# Patient Record
Sex: MALE | Race: WHITE | NOT HISPANIC OR LATINO | Employment: OTHER | URBAN - METROPOLITAN AREA
[De-identification: names, ages, dates, MRNs, and addresses within clinical notes are randomized per-mention and may not be internally consistent; named-entity substitution may affect disease eponyms.]

---

## 2023-05-04 ENCOUNTER — APPOINTMENT (OUTPATIENT)
Dept: PRIMARY CARE | Facility: CLINIC | Age: 63
End: 2023-05-04
Payer: COMMERCIAL

## 2023-05-11 PROBLEM — H31.003 CHORIORETINAL SCAR OF BOTH EYES: Status: ACTIVE | Noted: 2023-05-11

## 2023-05-11 PROBLEM — N18.4 STAGE 4 CHRONIC KIDNEY DISEASE (MULTI): Status: ACTIVE | Noted: 2023-05-11

## 2023-05-11 PROBLEM — Q23.81 BICUSPID AORTIC VALVE: Status: ACTIVE | Noted: 2023-05-11

## 2023-05-11 PROBLEM — M79.643 HAND PAIN: Status: ACTIVE | Noted: 2023-05-11

## 2023-05-11 PROBLEM — L97.519 TOE ULCER, RIGHT (MULTI): Status: ACTIVE | Noted: 2023-05-11

## 2023-05-11 PROBLEM — H35.81 MACULAR RETINAL EDEMA: Status: ACTIVE | Noted: 2023-05-11

## 2023-05-11 PROBLEM — D23.9 DYSPLASTIC NEVUS: Status: ACTIVE | Noted: 2023-05-11

## 2023-05-11 PROBLEM — E11.9 DIABETES MELLITUS (MULTI): Status: ACTIVE | Noted: 2023-05-11

## 2023-05-11 PROBLEM — E78.5 HYPERLIPIDEMIA: Status: ACTIVE | Noted: 2023-05-11

## 2023-05-11 PROBLEM — H44.521 PHTHISIS BULBI OF RIGHT EYE: Status: ACTIVE | Noted: 2023-05-11

## 2023-05-11 PROBLEM — H33.41 TRD (TRACTION RETINAL DETACHMENT), RIGHT: Status: ACTIVE | Noted: 2023-05-11

## 2023-05-11 PROBLEM — Z96.1 PSEUDOPHAKIA OF RIGHT EYE: Status: ACTIVE | Noted: 2023-05-11

## 2023-05-11 PROBLEM — E04.1 THYROID NODULE: Status: ACTIVE | Noted: 2023-05-11

## 2023-05-11 PROBLEM — E66.01 MORBID OBESITY (MULTI): Status: ACTIVE | Noted: 2023-05-11

## 2023-05-11 PROBLEM — E83.10 DISORDER OF IRON METABOLISM: Status: ACTIVE | Noted: 2023-05-11

## 2023-05-11 PROBLEM — I77.9 RIGHT-SIDED CAROTID ARTERY DISEASE (CMS-HCC): Status: ACTIVE | Noted: 2023-05-11

## 2023-05-11 PROBLEM — G62.9 PERIPHERAL NEUROPATHY: Status: ACTIVE | Noted: 2023-05-11

## 2023-05-11 PROBLEM — J90 PLEURAL EFFUSION: Status: ACTIVE | Noted: 2023-05-11

## 2023-05-11 PROBLEM — M72.0 DUPUYTREN'S DISEASE OF PALM: Status: ACTIVE | Noted: 2023-05-11

## 2023-05-11 PROBLEM — I10 HYPERTENSION: Status: ACTIVE | Noted: 2023-05-11

## 2023-05-11 PROBLEM — N18.9 CHRONIC RENAL INSUFFICIENCY: Status: ACTIVE | Noted: 2023-05-11

## 2023-05-11 PROBLEM — I20.89 STABLE ANGINA (CMS-HCC): Status: ACTIVE | Noted: 2023-05-11

## 2023-05-11 PROBLEM — I25.2 PAST MYOCARDIAL INFARCTION: Status: ACTIVE | Noted: 2023-05-11

## 2023-05-11 PROBLEM — D64.9 ANEMIA: Status: ACTIVE | Noted: 2023-05-11

## 2023-05-11 PROBLEM — E11.3599 PDR (PROLIFERATIVE DIABETIC RETINOPATHY) (MULTI): Status: ACTIVE | Noted: 2023-05-11

## 2023-05-11 PROBLEM — I70.0 AORTIC ATHEROSCLEROSIS (CMS-HCC): Status: ACTIVE | Noted: 2023-05-11

## 2023-05-11 PROBLEM — H33.8 OLD RETINAL DETACHMENT, PARTIAL: Status: ACTIVE | Noted: 2023-05-11

## 2023-05-11 PROBLEM — G47.33 OBSTRUCTIVE SLEEP APNEA: Status: ACTIVE | Noted: 2023-05-11

## 2023-05-11 PROBLEM — Q23.1 BICUSPID AORTIC VALVE (HHS-HCC): Status: ACTIVE | Noted: 2023-05-11

## 2023-05-11 PROBLEM — I65.29 CAROTID ATHEROSCLEROSIS: Status: ACTIVE | Noted: 2023-05-11

## 2023-05-11 PROBLEM — E66.09 EXOGENOUS OBESITY: Status: ACTIVE | Noted: 2023-05-11

## 2023-05-11 PROBLEM — R06.00 DYSPNEA: Status: ACTIVE | Noted: 2023-05-11

## 2023-05-11 PROBLEM — I35.1 MILD AORTIC INSUFFICIENCY: Status: ACTIVE | Noted: 2023-05-11

## 2023-05-11 PROBLEM — H21.40: Status: ACTIVE | Noted: 2023-05-11

## 2023-05-11 PROBLEM — H15.011 ANTERIOR SCLERITIS OF RIGHT EYE: Status: ACTIVE | Noted: 2023-05-11

## 2023-05-11 PROBLEM — R07.89 ATYPICAL CHEST PAIN: Status: ACTIVE | Noted: 2023-05-11

## 2023-05-11 PROBLEM — L98.9 SKIN LESION: Status: ACTIVE | Noted: 2023-05-11

## 2023-05-11 PROBLEM — N49.2 SCROTAL ABSCESS: Status: ACTIVE | Noted: 2023-05-11

## 2023-05-11 PROBLEM — H21.1X9: Status: ACTIVE | Noted: 2023-05-11

## 2023-05-11 PROBLEM — I25.10 ATHEROSCLEROSIS OF CORONARY ARTERY: Status: ACTIVE | Noted: 2023-05-11

## 2023-05-11 PROBLEM — I35.0 AORTIC STENOSIS, MILD: Status: ACTIVE | Noted: 2023-05-11

## 2023-05-11 PROBLEM — M19.079 ARTHRITIS OF BIG TOE: Status: ACTIVE | Noted: 2023-05-11

## 2023-05-11 PROBLEM — E53.8 VITAMIN B12 DEFICIENCY: Status: ACTIVE | Noted: 2023-05-11

## 2023-05-11 PROBLEM — N52.9 MALE ERECTILE DISORDER: Status: ACTIVE | Noted: 2023-05-11

## 2023-05-11 PROBLEM — R25.2 LEG CRAMPS: Status: ACTIVE | Noted: 2023-05-11

## 2023-05-11 PROBLEM — E55.9 VITAMIN D DEFICIENCY: Status: ACTIVE | Noted: 2023-05-11

## 2023-05-11 PROBLEM — M17.12 ARTHRITIS OF LEFT KNEE: Status: ACTIVE | Noted: 2023-05-11

## 2023-05-11 PROBLEM — R06.02 SOB (SHORTNESS OF BREATH): Status: ACTIVE | Noted: 2023-05-11

## 2023-05-11 PROBLEM — J40 BRONCHITIS: Status: ACTIVE | Noted: 2023-05-11

## 2023-05-11 PROBLEM — G25.81 RLS (RESTLESS LEGS SYNDROME): Status: ACTIVE | Noted: 2023-05-11

## 2023-05-11 PROBLEM — G47.30 SLEEP APNEA: Status: ACTIVE | Noted: 2023-05-11

## 2023-05-11 PROBLEM — R60.9 EDEMA: Status: ACTIVE | Noted: 2023-05-11

## 2023-05-11 PROBLEM — U07.1 DISEASE DUE TO SEVERE ACUTE RESPIRATORY SYNDROME CORONAVIRUS 2 (SARS-COV-2): Status: ACTIVE | Noted: 2023-05-11

## 2023-05-11 PROBLEM — H35.059 RETINAL NEOVASCULARIZATION: Status: ACTIVE | Noted: 2023-05-11

## 2023-05-11 PROBLEM — S61.239A: Status: ACTIVE | Noted: 2023-05-11

## 2023-05-11 PROBLEM — H31.013: Status: ACTIVE | Noted: 2023-05-11

## 2023-05-11 PROBLEM — H25.10 NUCLEAR SCLEROTIC CATARACT: Status: ACTIVE | Noted: 2023-05-11

## 2023-05-11 PROBLEM — E11.621 DIABETIC FOOT ULCER (MULTI): Status: ACTIVE | Noted: 2023-05-11

## 2023-05-11 PROBLEM — L97.509 DIABETIC FOOT ULCER (MULTI): Status: ACTIVE | Noted: 2023-05-11

## 2023-05-11 PROBLEM — J01.90 ACUTE SINUSITIS: Status: ACTIVE | Noted: 2023-05-11

## 2023-05-11 RX ORDER — BLOOD-GLUCOSE METER
KIT MISCELLANEOUS DAILY
COMMUNITY
Start: 2018-02-25

## 2023-05-11 RX ORDER — LEVOTHYROXINE SODIUM 25 UG/1
25 TABLET ORAL DAILY
COMMUNITY

## 2023-05-11 RX ORDER — FENOFIBRATE 160 MG/1
1 TABLET ORAL DAILY
COMMUNITY
Start: 2016-12-23

## 2023-05-11 RX ORDER — LISINOPRIL 40 MG/1
40 TABLET ORAL DAILY
COMMUNITY
End: 2024-02-12 | Stop reason: WASHOUT

## 2023-05-11 RX ORDER — ASPIRIN 81 MG/1
81 TABLET ORAL DAILY
COMMUNITY
Start: 2018-04-17

## 2023-05-11 RX ORDER — TORSEMIDE 20 MG/1
20 TABLET ORAL DAILY
COMMUNITY
Start: 2013-03-27

## 2023-05-11 RX ORDER — FOLIC ACID/VIT B COMPLEX AND C 0.8 MG
0.8 TABLET ORAL DAILY
COMMUNITY
Start: 2017-03-11

## 2023-05-11 RX ORDER — LOSARTAN POTASSIUM 50 MG/1
50 TABLET ORAL DAILY
COMMUNITY
Start: 2020-12-15 | End: 2023-10-19

## 2023-05-11 RX ORDER — HYDRALAZINE HYDROCHLORIDE 25 MG/1
25 TABLET, FILM COATED ORAL 3 TIMES DAILY
COMMUNITY
Start: 2016-12-01

## 2023-05-11 RX ORDER — VIT C/E/ZN/COPPR/LUTEIN/ZEAXAN 250MG-90MG
25 CAPSULE ORAL DAILY
COMMUNITY

## 2023-05-11 RX ORDER — INSULIN LISPRO 100 [IU]/ML
INJECTION, SOLUTION INTRAVENOUS; SUBCUTANEOUS DAILY
COMMUNITY
Start: 2016-12-21 | End: 2023-10-24 | Stop reason: SDUPTHER

## 2023-05-11 RX ORDER — DULAGLUTIDE 4.5 MG/.5ML
4.5 INJECTION, SOLUTION SUBCUTANEOUS
COMMUNITY
Start: 2021-11-05 | End: 2023-10-24 | Stop reason: SDUPTHER

## 2023-05-11 RX ORDER — METOPROLOL TARTRATE 50 MG/1
50 TABLET ORAL 2 TIMES DAILY
COMMUNITY
Start: 2012-07-31 | End: 2023-05-12 | Stop reason: SDUPTHER

## 2023-05-11 RX ORDER — AMLODIPINE BESYLATE 10 MG/1
10 TABLET ORAL DAILY
COMMUNITY
Start: 2014-12-29 | End: 2023-05-12 | Stop reason: SDUPTHER

## 2023-05-11 RX ORDER — INSULIN GLARGINE-YFGN 100 [IU]/ML
INJECTION, SOLUTION SUBCUTANEOUS
COMMUNITY
Start: 2022-06-10 | End: 2023-10-24 | Stop reason: SDUPTHER

## 2023-05-11 RX ORDER — CLOPIDOGREL BISULFATE 75 MG/1
75 TABLET ORAL DAILY
COMMUNITY
Start: 2015-01-14 | End: 2023-05-12 | Stop reason: SDUPTHER

## 2023-05-11 RX ORDER — ROSUVASTATIN CALCIUM 40 MG/1
40 TABLET, COATED ORAL NIGHTLY
COMMUNITY
Start: 2018-09-12

## 2023-05-11 RX ORDER — ATORVASTATIN CALCIUM 40 MG/1
40 TABLET, FILM COATED ORAL DAILY
COMMUNITY

## 2023-05-11 RX ORDER — ICOSAPENT ETHYL 1 G/1
CAPSULE ORAL
COMMUNITY
Start: 2022-06-28

## 2023-05-12 ENCOUNTER — OFFICE VISIT (OUTPATIENT)
Dept: PRIMARY CARE | Facility: CLINIC | Age: 63
End: 2023-05-12
Payer: COMMERCIAL

## 2023-05-12 VITALS
SYSTOLIC BLOOD PRESSURE: 114 MMHG | DIASTOLIC BLOOD PRESSURE: 67 MMHG | OXYGEN SATURATION: 97 % | HEIGHT: 70 IN | WEIGHT: 296.6 LBS | BODY MASS INDEX: 42.46 KG/M2 | HEART RATE: 81 BPM | RESPIRATION RATE: 18 BRPM

## 2023-05-12 DIAGNOSIS — I25.10 ATHEROSCLEROSIS OF CORONARY ARTERY, UNSPECIFIED VESSEL OR LESION TYPE, UNSPECIFIED WHETHER ANGINA PRESENT, UNSPECIFIED WHETHER NATIVE OR TRANSPLANTED HEART: ICD-10-CM

## 2023-05-12 DIAGNOSIS — I10 PRIMARY HYPERTENSION: Primary | ICD-10-CM

## 2023-05-12 DIAGNOSIS — R43.9 DISTURBANCE OF SMELL: ICD-10-CM

## 2023-05-12 PROCEDURE — 4010F ACE/ARB THERAPY RXD/TAKEN: CPT | Performed by: FAMILY MEDICINE

## 2023-05-12 PROCEDURE — 3074F SYST BP LT 130 MM HG: CPT | Performed by: FAMILY MEDICINE

## 2023-05-12 PROCEDURE — 1036F TOBACCO NON-USER: CPT | Performed by: FAMILY MEDICINE

## 2023-05-12 PROCEDURE — 99214 OFFICE O/P EST MOD 30 MIN: CPT | Performed by: FAMILY MEDICINE

## 2023-05-12 PROCEDURE — 3078F DIAST BP <80 MM HG: CPT | Performed by: FAMILY MEDICINE

## 2023-05-12 RX ORDER — AMLODIPINE BESYLATE 10 MG/1
10 TABLET ORAL DAILY
Qty: 90 TABLET | Refills: 0 | Status: SHIPPED | OUTPATIENT
Start: 2023-05-12 | End: 2024-02-16

## 2023-05-12 RX ORDER — CLOPIDOGREL BISULFATE 75 MG/1
75 TABLET ORAL DAILY
Qty: 90 TABLET | Refills: 0 | Status: SHIPPED | OUTPATIENT
Start: 2023-05-12 | End: 2023-08-10

## 2023-05-12 RX ORDER — METOPROLOL TARTRATE 50 MG/1
50 TABLET ORAL 2 TIMES DAILY
Qty: 180 TABLET | Refills: 0 | Status: SHIPPED | OUTPATIENT
Start: 2023-05-12 | End: 2024-02-16

## 2023-05-12 NOTE — PATIENT INSTRUCTIONS
Refilled medications.  Referred to ENT as requested.  Follow up with specialists as directed.    F/U 3 months: Annual wellness visit.

## 2023-05-12 NOTE — PROGRESS NOTES
"Subjective   Patient ID: Nicolas Shultz is a 62 y.o. male who presents for NP TO [facility] CARE (Med refills).    HPI   Initial visit.    H/O CAD, HTN.  Condition(s) stable.  Taking med(s) as directed.  Requests refills (Metoprolol, Norvasc, Plavix).    Reports disturbance of smell.  Requests ENT referral.    Of note:  Current specialist include endo, nephrology, pulm, cardiology, podiatry, ophtho.    Review of Systems   All other systems reviewed and are negative.  Objective   /67   Pulse 81   Resp 18   Ht 1.778 m (5' 10\")   Wt 135 kg (296 lb 9.6 oz)   SpO2 97%   BMI 42.56 kg/m²   Physical Exam  Constitutional:       General: He is not in acute distress.     Appearance: He is obese.   Eyes:      Conjunctiva/sclera: Conjunctivae normal.   Cardiovascular:      Rate and Rhythm: Normal rate and regular rhythm.      Heart sounds: Murmur (3/6 systolic murmur @RUSB)) heard.   Pulmonary:      Effort: Pulmonary effort is normal.      Breath sounds: No wheezing, rhonchi or rales.   Neurological:      Mental Status: He is oriented to person, place, and time.   Psychiatric:         Mood and Affect: Mood normal.         Behavior: Behavior normal.     Assessment/Plan   Diagnoses and all orders for this visit:  Primary hypertension  -     metoprolol tartrate (Lopressor) 50 mg tablet; Take 1 tablet (50 mg) by mouth 2 times a day.  -     amLODIPine (Norvasc) 10 mg tablet; Take 1 tablet (10 mg) by mouth once daily.  Atherosclerosis of coronary artery, unspecified vessel or lesion type, unspecified whether angina present, unspecified whether native or transplanted heart  -     clopidogrel (Plavix) 75 mg tablet; Take 1 tablet (75 mg) by mouth once daily.  Disturbance of smell  -     Referral to ENT; Future    Refilled medications.  Referred to ENT as requested.  Follow up with specialists as directed.    F/U 3 months: Annual wellness visit.  "

## 2023-09-20 DIAGNOSIS — D12.2 ADENOMATOUS POLYP OF ASCENDING COLON: Primary | ICD-10-CM

## 2023-10-04 ENCOUNTER — OFFICE VISIT (OUTPATIENT)
Dept: OTOLARYNGOLOGY | Facility: CLINIC | Age: 63
End: 2023-10-04
Payer: COMMERCIAL

## 2023-10-04 VITALS — WEIGHT: 305.8 LBS | HEIGHT: 71 IN | BODY MASS INDEX: 42.81 KG/M2 | TEMPERATURE: 97.5 F

## 2023-10-04 DIAGNOSIS — R43.8 HYPOSMIA: Primary | ICD-10-CM

## 2023-10-04 DIAGNOSIS — R43.1 PAROSMIA: ICD-10-CM

## 2023-10-04 PROCEDURE — 31231 NASAL ENDOSCOPY DX: CPT | Performed by: NURSE PRACTITIONER

## 2023-10-04 PROCEDURE — 4010F ACE/ARB THERAPY RXD/TAKEN: CPT | Performed by: NURSE PRACTITIONER

## 2023-10-04 PROCEDURE — 3044F HG A1C LEVEL LT 7.0%: CPT | Performed by: NURSE PRACTITIONER

## 2023-10-04 PROCEDURE — 99204 OFFICE O/P NEW MOD 45 MIN: CPT | Performed by: NURSE PRACTITIONER

## 2023-10-04 PROCEDURE — 1036F TOBACCO NON-USER: CPT | Performed by: NURSE PRACTITIONER

## 2023-10-04 RX ORDER — GABAPENTIN 300 MG/1
300 CAPSULE ORAL 3 TIMES DAILY
COMMUNITY

## 2023-10-04 ASSESSMENT — PATIENT HEALTH QUESTIONNAIRE - PHQ9
1. LITTLE INTEREST OR PLEASURE IN DOING THINGS: NOT AT ALL
2. FEELING DOWN, DEPRESSED OR HOPELESS: NOT AT ALL
SUM OF ALL RESPONSES TO PHQ9 QUESTIONS 1 AND 2: 0

## 2023-10-04 NOTE — PATIENT INSTRUCTIONS
Today you were evaluated by Jazzy Doan CNP.    Please follow-up in January, soon if needed. If you have any questions or concerns, please contact my office at [phone].    Smell Retraining Therapy:  Over the course of 12 weeks, please initiate Smell Retraining Therapy.  This involves using scents that you were previously familiar with and reintroducing them to your smell nerves.   Twice daily, you should smell a strong recognizable scent (lemon/citrus, coffee, etc) for at least 10 seconds. While you do this, repeat what the smell is in your mind.  If you would like, one way to improve things further would be to purchase scented essential oils.  Studies have shown that sniffing four essential oils - jayme, eucalyptus, citronella, and clove - twice a day for 12 weeks can improve smell in some patients. These essential oils can be found at most online retailers. Several medical studies have shown an improvement in smell in some patients with using this method.

## 2023-10-04 NOTE — PROGRESS NOTES
Subjective   Patient ID: Nicolas Shultz is a 62 y.o. male who presents for Sinus Problem (Sinus issues for 1 year).  HPI  Nicolas Shultz  is a 62 y.o. old  male who presents today for evaluation of difficulty with smell for 1 year in duration. Specifically, the patient reports a sudden change in his sense of smell. He notes that things such as coffee, exhaust, gasoline, carry a sulfur/acid smell to them. He reports that this occurred suddenly. He wonders if use of gabapentin has contributed to some improvement of this (he has been on about a month). He denies difficulty with his sense of taste. He denies sinus disease, recent URI, head trauma, exposure to toxic fumes. Patient denies facial pressure, facial pain, periorbital edema, epiphora, and epistaxis. Does not have a history of allergic rhinitis or allergies. They deny anything like this occurring in the past. The patient has taken gabapentin, flonase to help alleviate the problem. This has improved the symptoms. Symptoms did occur about 1.5 weeks prior to a COVID infection.    I have also reviewed prior note from Dr. Praveen Crandall dated 6/13/23, 7/31/23  and this is contributing to my history and assessment.     I personally reviewed the patients CT scan images and results. I discussed the results personally with the patient. The following findings were discussed: 6/29/23: MRI Brain: Septum midline. Scattered inflammation through the ethmoid air cells. Otherwise, paranasal sinuses are clear. No obvious abnormalities along the skull base, near the olfactory nerves.        Review of Systems  Review of systems is negative for constitutional, ophthalmological, cardiac, pulmonary, renal, gastrointestinal, musculoskeletal, mental health, endocrine, or neurologic disorders (except as listed in the HPI, PMH, and Problem List).     Objective   Physical Exam  CONSTITUTIONAL: Vital signs reviewed. Patient appears well developed and well nourished.   GENERAL: this is a  healthy appearing male who appears stated age. The patient is alert and appropriately verbally conversant without hoarseness. This patient is in no apparent distress.   FACE: The face was inspected and no cutaneous masses or lesions were visualized. There was no erythema or edema noted. Facial movement was symmetric. No skin lesions were detected. There was no sinus tenderness elicited. TMJ crepitus *absent/present*.   EYES: Extra-ocular muscle function was intact. No nystagmus was observed. Pupils were equal.   CRANIAL NERVES: Cranial nerves II, III, IV, and VI were noted to be intact via extra-ocular muscle movement testing. Cranial nerve VII noted to be intact and symmetric by facial movement. Cranial nerves IX and X noted to be intact by gag reflex and palatal movement. Cranial nerve XII noted to be intact by active and symmetric tongue movement.   NOSE: Examination of the nose revealed the nasal dorsum to be midline. Intranasal exam reveals the septum is not significantly deviated. The inferior turbinates were without abnormality. No masses, polyps, mucopus, or other lesion on anterior rhinoscopy. See below procedure note as applicable for further exam.  ORAL CAVITY: Examination of the oral cavity revealed no mass lesions nor infection. The palate was noted to be intact. The tongue exhibited normal mobility. Mucosa was moist without lesion. The lips were free of lesion. Gums were free of inflammation. Dentition: normal without obvious infection or inflammation  OROPHARYNX: The oral pharynx was free of mass lesion or mucosal abnormality. The palate was noted to be without lesion. The uvula was normal appearing. The tonsils were normal appearing.  EARS: Examination of the ears revealed that the auricles were normally formed with no lesions. The external auditory canals were normal. The tympanic membranes were intact.  There is no inflammation visualized.   NECK: Visualization and palpation of the neck revealed no  mass lesions. No skin lesions or inflammatory processes were detected. The cervical musculature was normal to palpation.   CERVICAL LYMPHATICS: There were no palpable lymph nodes in the posterior triangle, submandibular triangle, jugulodigastric region, or central neck.  RESPIRATORY: Normal inspiration and expiration and chest wall expansion, no use of accessory muscles to breathe, no stridor.  NEUROLOGICAL: Patient is ambulatory without assist. Mentation is clear. Answering questions appropriately.     Nasal / sinus endoscopy    Date/Time: 10/4/2023 10:54 AM    Performed by: BANDAR Powell  Authorized by: BANDAR Powell    Consent:     Consent obtained:  Verbal    Consent given by:  Patient    Risks discussed:  Pain and bleeding    Alternatives discussed:  No treatment and observation  Procedure details:     Indications: assessment of airway and sino-nasal symptoms      Medication:  Afrin and Albert-Synephrine 2%    Instrument: flexible fiberoptic nasal endoscope      Scope location: bilateral nare    Post-procedure details:     Patient tolerance of procedure:  Tolerated well, no immediate complications  Comments:      Findings: After topical decongestion with decongestant and anesthetic spray, nasal endoscopy was performed using an endoscope. The septum was slightly right deviated. The inferior turbinates were normal.  The middle turbinates appeared healthy, the middle meatus is free of purulence, masses, lesions or polyps. The superior meatus and sphenoethmoid recess are clear bilaterally. Olfactory recess and sphenoethmoid recess is clear. The nasal passageway is patent. The nasopharynx was clear. There were no complications and the patient tolerated the procedure well.         Assessment/Plan     Assessment:  Nicolas Shultz is a 62 y.o. 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 Most likely cause is a viral insult to the  olfactory nerves versus a fume injury to the smell nerves. Rec. Smell retraining therapy.     Plan:  Reassurance and counseling provided to patient: We discussed that the most common reasons for alterations or loss of sense of smell/taste are due to idiopathic causes and chronic sinusitis with nasal polyps. Today the patient had no evidence of nasal polyps.    I personally reviewed the patients CT scan images and results. I discussed the results personally with the patient. The following findings were discussed: 6/29/23: MRI Brain: Septum midline. Scattered inflammation through the ethmoid air cells. Otherwise, paranasal sinuses are clear. No obvious abnormalities along the skull base, near the olfactory nerves.    1. The patient was counseled on smell retraining therapy. I would like him to begin this therapy and continue for 12 weeks. We discussed obtaining vials of essential oils and performing this twice daily.   2. The patient is having benefit with use of gabapentin. This is not something I typically offer for smell related injuries and I recommended that he follow up with either his PCP or neurology for continuation.  3. Patient will follow-up in 12 weeks or sooner if needed (virtually). Patient verbalizes understanding instructions and agrees with established plan of care.

## 2023-10-19 DIAGNOSIS — I10 PRIMARY HYPERTENSION: Primary | ICD-10-CM

## 2023-10-19 RX ORDER — LOSARTAN POTASSIUM 50 MG/1
50 TABLET ORAL DAILY
Qty: 30 TABLET | Refills: 0 | Status: SHIPPED | OUTPATIENT
Start: 2023-10-19 | End: 2023-11-20 | Stop reason: SDUPTHER

## 2023-10-24 DIAGNOSIS — Z79.4 TYPE 2 DIABETES MELLITUS WITHOUT COMPLICATION, WITH LONG-TERM CURRENT USE OF INSULIN (MULTI): ICD-10-CM

## 2023-10-24 DIAGNOSIS — E11.9 TYPE 2 DIABETES MELLITUS WITHOUT COMPLICATION, WITH LONG-TERM CURRENT USE OF INSULIN (MULTI): ICD-10-CM

## 2023-10-24 RX ORDER — INSULIN LISPRO 100 [IU]/ML
80 INJECTION, SOLUTION INTRAVENOUS; SUBCUTANEOUS DAILY
Qty: 75 ML | Refills: 3 | Status: SHIPPED | OUTPATIENT
Start: 2023-10-24

## 2023-10-24 RX ORDER — INSULIN GLARGINE-YFGN 100 [IU]/ML
70 INJECTION, SOLUTION SUBCUTANEOUS NIGHTLY
Qty: 90 ML | Refills: 3 | Status: SHIPPED | OUTPATIENT
Start: 2023-10-24

## 2023-10-24 RX ORDER — DULAGLUTIDE 4.5 MG/.5ML
4.5 INJECTION, SOLUTION SUBCUTANEOUS
Qty: 2 ML | Refills: 3 | Status: SHIPPED | OUTPATIENT
Start: 2023-10-24 | End: 2024-03-08 | Stop reason: SDUPTHER

## 2023-11-20 DIAGNOSIS — I10 PRIMARY HYPERTENSION: ICD-10-CM

## 2023-11-20 RX ORDER — LOSARTAN POTASSIUM 50 MG/1
50 TABLET ORAL DAILY
Qty: 90 TABLET | Refills: 3 | Status: SHIPPED | OUTPATIENT
Start: 2023-11-20 | End: 2024-11-19

## 2023-12-12 DIAGNOSIS — N18.4 STAGE 4 CHRONIC KIDNEY DISEASE (MULTI): Primary | ICD-10-CM

## 2023-12-21 ENCOUNTER — OFFICE VISIT (OUTPATIENT)
Dept: ENDOCRINOLOGY | Facility: CLINIC | Age: 63
End: 2023-12-21
Payer: COMMERCIAL

## 2023-12-21 VITALS — SYSTOLIC BLOOD PRESSURE: 124 MMHG | WEIGHT: 315 LBS | BODY MASS INDEX: 44.21 KG/M2 | DIASTOLIC BLOOD PRESSURE: 78 MMHG

## 2023-12-21 DIAGNOSIS — E04.1 THYROID NODULE: ICD-10-CM

## 2023-12-21 DIAGNOSIS — E78.5 HYPERLIPIDEMIA, UNSPECIFIED HYPERLIPIDEMIA TYPE: ICD-10-CM

## 2023-12-21 DIAGNOSIS — E11.9 TYPE 2 DIABETES MELLITUS WITHOUT COMPLICATION, WITH LONG-TERM CURRENT USE OF INSULIN (MULTI): Primary | ICD-10-CM

## 2023-12-21 DIAGNOSIS — Z79.4 TYPE 2 DIABETES MELLITUS WITHOUT COMPLICATION, WITH LONG-TERM CURRENT USE OF INSULIN (MULTI): Primary | ICD-10-CM

## 2023-12-21 DIAGNOSIS — I10 PRIMARY HYPERTENSION: ICD-10-CM

## 2023-12-21 LAB — POC HEMOGLOBIN A1C: 6.7 % (ref 4.2–6.5)

## 2023-12-21 PROCEDURE — 3078F DIAST BP <80 MM HG: CPT | Performed by: INTERNAL MEDICINE

## 2023-12-21 PROCEDURE — 3044F HG A1C LEVEL LT 7.0%: CPT | Performed by: INTERNAL MEDICINE

## 2023-12-21 PROCEDURE — 99214 OFFICE O/P EST MOD 30 MIN: CPT | Performed by: INTERNAL MEDICINE

## 2023-12-21 PROCEDURE — 4010F ACE/ARB THERAPY RXD/TAKEN: CPT | Performed by: INTERNAL MEDICINE

## 2023-12-21 PROCEDURE — 83036 HEMOGLOBIN GLYCOSYLATED A1C: CPT | Performed by: INTERNAL MEDICINE

## 2023-12-21 PROCEDURE — 1036F TOBACCO NON-USER: CPT | Performed by: INTERNAL MEDICINE

## 2023-12-21 PROCEDURE — 3074F SYST BP LT 130 MM HG: CPT | Performed by: INTERNAL MEDICINE

## 2023-12-21 NOTE — PROGRESS NOTES
Patient ID: Nicolas Shultz is a 63 y.o. male who presents for Follow-up.  HPI  The patient comes in for follow up.    Type 2 diabetes hypertension hyperlipidemia renal insufficiency thyroid nodules.    He continues on Humalog 20 to 30 units at breakfast 0 at lunch 40-50 at dinner Semglee 40 units and Trulicity 4.5 mg Jardiance through his nephrologist.    He continues using the freestyle sha 2.    His 90-day average 147 time in target 74%.    He had a carotid ultrasound which revealed a thyroid nodule.    In April 2022 he had 3 TI-RADS 1 nodules on the right and 1 TI-RADS 1 nodule on the left with a TI-RADS three 1.7 x 1.5 x 1.4 cm nodule in the isthmus.    In June 2023 we repeated an ultrasound which was stable and plan to repeat ultrasounds June 2025 and 2027.    He notes no change within his thyroid.    Physically he has no other complaints.    ROS  Comprehensive review of systems is negative.    Objective   Physical Exam  Visit Vitals  /78      Vitals:    12/21/23 1312   Weight: 144 kg (317 lb)      Body mass index is 44.21 kg/m².      Weight 317 up 12 pounds still down 12 from his high    ENT normal. No adenopathy  Fundi not examined thyroid palpable and normal. No nodules  Chest clear to auscultation  Heart sounds are normal  Abdomen nontender. Bowel sounds normal. No organomegaly  Feet not examined  Assessment/Plan     1.  Type 2 diabetes on insulin uncontrolled  2.  Renal insufficiency  3.  TI-RADS 3 thyroid nodule  4.  Hypertension  5.  Hyperlipidemia    Will check hemoglobin A1c by fingerstick today.    He will look for patterns in his numbers and make adjustments in his insulin accordingly.    Will plan to repeat an ultrasound June 2025 and 2027.    Will look at switching to the freestyle sha 3 once the reader is available.    He will follow-up with me in 3 months sooner as needed.

## 2024-01-03 ENCOUNTER — OFFICE VISIT (OUTPATIENT)
Dept: NEUROLOGY | Facility: CLINIC | Age: 64
End: 2024-01-03
Payer: COMMERCIAL

## 2024-01-03 VITALS
WEIGHT: 315 LBS | TEMPERATURE: 97.3 F | DIASTOLIC BLOOD PRESSURE: 60 MMHG | SYSTOLIC BLOOD PRESSURE: 132 MMHG | HEIGHT: 70 IN | HEART RATE: 83 BPM | BODY MASS INDEX: 45.1 KG/M2

## 2024-01-03 DIAGNOSIS — R43.9 DYSOSMIA: Primary | ICD-10-CM

## 2024-01-03 DIAGNOSIS — E11.42 DIABETIC PERIPHERAL NEUROPATHY (MULTI): ICD-10-CM

## 2024-01-03 PROCEDURE — 4010F ACE/ARB THERAPY RXD/TAKEN: CPT | Performed by: PSYCHIATRY & NEUROLOGY

## 2024-01-03 PROCEDURE — 3078F DIAST BP <80 MM HG: CPT | Performed by: PSYCHIATRY & NEUROLOGY

## 2024-01-03 PROCEDURE — 99205 OFFICE O/P NEW HI 60 MIN: CPT | Performed by: PSYCHIATRY & NEUROLOGY

## 2024-01-03 PROCEDURE — 3075F SYST BP GE 130 - 139MM HG: CPT | Performed by: PSYCHIATRY & NEUROLOGY

## 2024-01-03 PROCEDURE — 1036F TOBACCO NON-USER: CPT | Performed by: PSYCHIATRY & NEUROLOGY

## 2024-01-03 NOTE — PROGRESS NOTES
NEUROLOGY OUTPATIENT INITIAL VISIT NOTE    Assessment/Plan   Diagnoses and all orders for this visit:  Dysosmia  Diabetic peripheral neuropathy (CMS/HCC)      IMPRESSION:  Sudden onset of dysosmia in the context of environmental exposure.  I cannot absolutely rule out the involvement of COVID-19, but this is less likely given the amount of time between the start of this problem and the diagnosis.  He has no history of trauma that would cause dysosmia.  Cranial MRI revealed no intracranial structural pathology that would add to his symptoms.  No other signs on exam, or symptoms, that would suggest multiple cranial neuropathy as a factor in this presentation.  Given a positive effect from gabapentin, it is likely there is a neuropathic component to the current presentation, potentially inflammation referable to environmental exposure.  He has exam evidence for diabetic peripheral neuropathy involving the feet.    PLAN:  He has good reason to continue gabapentin given it is helping both the dysosmia and the peripheral neuropathy.  I advised him that if he wants to try tapering gabapentin in the future, to slowly taper it over two weeks.  I am happy to see him again as needed or as requested.    Inocente Moralez Jr., M.D., FAAN       --------      Subjective     Nicolas Shultz is a 63 y.o. year old, right-handed male referred by ENT for dysosmia.    HPI  In October 2022, he had sudden onset of dysosmia after exposure to dust and mold.  He sensed a constant sulfur and burning, smell.  He had difficulty eating because of the altered taste.  He was tested for, and found positive for,COVID-19 three weeks later after a friend notified him that he'd been exposed.  At that point, the patient developed URI symptoms that had not been previously present.  He denies other focal neurological symptoms including dysarthria, dysphagia, diplopia, focal weakness, other focal sensory change, ataxia, vertigo, or bowel/bladder  incontinence, among others, in the context of this initial presentation, though he does have chronic tingling of the feet that is mild.  He ultimately presented to Dr. Lundberg (ENT), who ordered cranial MRI that was negative (study reviewed).  At that point, the patient was started on a two-week trial of gabapentin for the dysosmia by Dr. Lundberg.  It not only helped to reduce the sulfur/burning smell, it also reduced the tingling of the feet.  When the trial ended, the symptoms not only returned, he also went through withdrawal from gabapentin.  It was at that point he was referred for a neurological evaluation.    Since then, his PCP started prescribing the gabapentin and the symptoms have again improved.  He was prescribed enough for tid, but the patient is so worried about a lack of the medication that he is taking 300 mg bid instead, and it is still helping his symptoms, including the tingling in the feet.      Review of Systems   All other systems reviewed and are negative.      Patient Active Problem List   Diagnosis    Acute sinusitis    Anemia    Anterior scleritis of right eye    Aortic stenosis, mild    Arthritis of big toe    Arthritis of left knee    Aortic atherosclerosis (CMS/HCC)    Carotid atherosclerosis    Atherosclerosis of coronary artery    Atypical chest pain    Bicuspid aortic valve    Bombe of iris    Bronchitis    Chorioretinal scar of both eyes    Diabetes mellitus (CMS/HCC)    Disease due to severe acute respiratory syndrome coronavirus 2 (SARS-CoV-2)    Disorder of iron metabolism    Dyspnea    SOB (shortness of breath)    Edema    Exogenous obesity    Morbid obesity (CMS/HCC)    Hand pain    Hyperlipidemia    Hypertension    Leg cramps    Macula scar of posterior pole of both eyes    Macular retinal edema    Male erectile disorder    Mild aortic insufficiency    Nuclear sclerotic cataract    NVI (new vessels iris)    Old retinal detachment, partial    Past myocardial infarction    PDR  (proliferative diabetic retinopathy) (CMS/HCC)    Peripheral neuropathy    Phthisis bulbi of right eye    Pleural effusion    Pseudophakia of right eye    Puncture wound of finger of left hand with complication    Dupuytren's disease of palm    Dysplastic nevus    Retinal neovascularization    Skin lesion    Right-sided carotid artery disease (CMS/HCC)    RLS (restless legs syndrome)    Scrotal abscess    Obstructive sleep apnea    Sleep apnea    Stable angina    Chronic renal insufficiency    Stage 4 chronic kidney disease (CMS/HCC)    Thyroid nodule    Diabetic foot ulcer (CMS/HCC)    Toe ulcer, right (CMS/HCC)    TRD (traction retinal detachment), right    Vitamin B12 deficiency    Vitamin D deficiency     Past Medical History:   Diagnosis Date    Anterior scleritis, 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    Anterior scleritis of right eye    Anterior scleritis, right eye 06/05/2015    Anterior scleritis of right eye    Elevated blood-pressure reading, without diagnosis of hypertension 10/30/2013    Elevated blood pressure reading without diagnosis of hypertension    Encounter for screening for malignant neoplasm of rectum     Encounter for screening for malignant neoplasm of rectum    Glaucoma secondary to other eye disorders, unspecified eye, stage unspecified 07/09/2014    NVG (neovascular glaucoma)    Male erectile dysfunction, unspecified     Inability to attain erection    Obstructive sleep apnea (adult) (pediatric)     Obstructive sleep apnea    Ocular hypertension, unspecified eye 10/30/2014    Elevated IOP    Old myocardial infarction 09/01/2016    History of myocardial infarction    Other obesity due to excess calories     Exogenous obesity    Personal history of diseases of the blood and blood-forming organs and certain disorders involving the immune mechanism     History of anemia    Personal history of other diseases of the circulatory system 02/17/2015    History of hypertension    Personal history of other diseases  of the circulatory system 02/17/2015    History of arteriosclerotic cardiovascular disease    Personal history of other diseases of the nervous system and sense organs 02/28/2014    History of retinal detachment    Personal history of other diseases of the respiratory system     History of pleural effusion    Personal history of other diseases of urinary system     History of renal insufficiency syndrome    Personal history of other endocrine, nutritional and metabolic disease     History of hyperlipidemia    Personal history of other specified conditions     History of chest pain     Past Surgical History:   Procedure Laterality Date    CORONARY ANGIOPLASTY WITH STENT PLACEMENT  02/17/2015    CORONARY ANGIOPLASTY WITH STENT PLACEMENT  12/17/2014    FEMUR FRACTURE SURGERY  02/28/2014    LUNG SURGERY  02/28/2014    RETINAL DETACHMENT SURGERY  02/28/2014     Social History     Tobacco Use    Smoking status: Never    Smokeless tobacco: Never   Substance Use Topics    Alcohol use: Not Currently     family history includes Depression in his brother; Restless legs syndrome in his father; Skin cancer in his mother; Sleep apnea in his father.    Current Outpatient Medications:     amLODIPine (Norvasc) 10 mg tablet, Take 1 tablet (10 mg) by mouth once daily., Disp: 90 tablet, Rfl: 0    aspirin 81 mg EC tablet, Take 1 tablet (81 mg) by mouth once daily., Disp: , Rfl:     atorvastatin (Lipitor) 40 mg tablet, Take 1 tablet (40 mg) by mouth once daily., Disp: , Rfl:     B complex-vitamin C-folic acid (Josy-Jesus) 0.8 mg tablet, Take 1 tablet by mouth once daily., Disp: , Rfl:     cholecalciferol (Vitamin D-3) 25 MCG (1000 UT) capsule, Take 1 capsule (25 mcg) by mouth once daily., Disp: , Rfl:     dulaglutide (Trulicity) 4.5 mg/0.5 mL pen injector, Inject 4.5 mg under the skin 1 (one) time per week., Disp: 2 mL, Rfl: 3    empagliflozin (Jardiance) 10 mg, Take 1 tablet (10 mg) by mouth once daily., Disp: 90 tablet, Rfl: 3     "fenofibrate (Triglide) 160 mg tablet, Take 1 tablet (160 mg) by mouth once daily., Disp: , Rfl:     FreeStyle Lite Strips strip, once daily., Disp: , Rfl:     gabapentin (Neurontin) 300 mg capsule, Take 1 capsule (300 mg) by mouth 3 times a day., Disp: , Rfl:     hydrALAZINE (Apresoline) 25 mg tablet, Take 1 tablet (25 mg) by mouth 3 times a day., Disp: , Rfl:     icosapent ethyL (Vascepa) 1 gram capsule, Take by mouth 2 times a day with meals., Disp: , Rfl:     insulin glargine-yfgn (Semglee,insulin glarg-yfgn,Pen) 100 unit/mL (3 mL) Pen, Inject 70 Units under the skin once daily at bedtime., Disp: 90 mL, Rfl: 3    insulin lispro (HumaLOG KwikPen Insulin) 100 unit/mL injection, Inject 80 Units under the skin once daily., Disp: 75 mL, Rfl: 3    levothyroxine (Synthroid, Levoxyl) 25 mcg tablet, Take 1 tablet (25 mcg) by mouth once daily., Disp: , Rfl:     lisinopril 40 mg tablet, Take 1 tablet (40 mg) by mouth once daily., Disp: , Rfl:     losartan (Cozaar) 50 mg tablet, Take 1 tablet (50 mg) by mouth once daily., Disp: 90 tablet, Rfl: 3    metoprolol tartrate (Lopressor) 50 mg tablet, Take 1 tablet (50 mg) by mouth 2 times a day., Disp: 180 tablet, Rfl: 0    rosuvastatin (Crestor) 40 mg tablet, Take 1 tablet (40 mg) by mouth once daily at bedtime., Disp: , Rfl:     SITagliptin phosphate (Januvia) 50 mg tablet, Take 1 tablet (50 mg) by mouth once daily., Disp: , Rfl:     torsemide (Demadex) 20 mg tablet, Take 1 tablet (20 mg) by mouth once daily., Disp: , Rfl:   Allergies   Allergen Reactions    Penicillins Hives, Rash and Shortness of breath       Objective     /60   Pulse 83   Temp 36.3 °C (97.3 °F)   Ht 1.778 m (5' 10\")   Wt 143 kg (316 lb)   BMI 45.34 kg/m²     CONSTITUTIONAL:  No acute distress    CARDIOVASCULAR:  Normal pulses in the distal legs, no edema of either arm or either leg.  No carotid bruits.    MENTAL STATUS:  Awake, alert, fully oriented to self, place, and time, with present short-term " memory, good awareness of recent events, normal attention span, concentration, and fund of knowledge.    SPEECH AND LANGUAGE:  Can name and repeat, follows all commands, has no dysarthria    FUNDOSCOPIC:  No papilledema    CRANIAL NERVES:  I--Present smell sensation to strong coffee in both nostrils, less so on the right.    II-Vision present, visual fields full to confrontational testing    III/IV/VI--EOMs are present in all directions.  Pupils are symmetrically reactive in dim light.  No ptosis.    V--Normal facial sensation.    VII--No facial asymmetry.    VIII--Hearing present to voice bilaterally.    IX/X--Symmetric soft palate rise.    XI--Normal trapezius power bilaterally.    XII--Tongue protrudes without deviation.    MOTOR:  Normal power, tone, and bulk in both arms and both legs.    SENSORY:  Reduced pin sensation in a stocking distribution from the feet to the ankles.  Otherwise normal pin sensation in both arms and both legs without asymmetry, or spinal sensory level.    COORDINATION:  Normal finger-to-nose and heel-to-shin testing in both arms and both legs.    REFLEXES are normal and symmetric at the biceps, triceps, brachioradialis, patella, and depressed at the ankle.  The plantar responses are flexor.    GAIT is normal, without steppage, ataxia, shuffling, or spasticity.        Inocente Moralez Jr., M.D., FAAN

## 2024-01-09 ENCOUNTER — TELEPHONE (OUTPATIENT)
Dept: CARDIOLOGY | Facility: HOSPITAL | Age: 64
End: 2024-01-09
Payer: COMMERCIAL

## 2024-01-09 ENCOUNTER — TELEPHONE (OUTPATIENT)
Dept: ENDOCRINOLOGY | Facility: CLINIC | Age: 64
End: 2024-01-09
Payer: COMMERCIAL

## 2024-01-09 NOTE — TELEPHONE ENCOUNTER
He should take his insulin and diabetes medications as he normally does and use the Humalog on a sliding scale the day of the colonoscopy if he is high and needs to do a correction.

## 2024-01-09 NOTE — TELEPHONE ENCOUNTER
He's having a colonoscopy om 1/15, needs to know what he should do regarding his medications, please advise.

## 2024-01-09 NOTE — TELEPHONE ENCOUNTER
Phoned patient and provided plan of care per Dr. Ryan notation.  Patient verbalized understanding and all questions answered.

## 2024-01-15 ENCOUNTER — ANESTHESIA (OUTPATIENT)
Dept: GASTROENTEROLOGY | Facility: HOSPITAL | Age: 64
End: 2024-01-15
Payer: COMMERCIAL

## 2024-01-15 ENCOUNTER — HOSPITAL ENCOUNTER (OUTPATIENT)
Dept: GASTROENTEROLOGY | Facility: HOSPITAL | Age: 64
Setting detail: OUTPATIENT SURGERY
Discharge: HOME | End: 2024-01-15
Payer: COMMERCIAL

## 2024-01-15 ENCOUNTER — ANESTHESIA EVENT (OUTPATIENT)
Dept: GASTROENTEROLOGY | Facility: HOSPITAL | Age: 64
End: 2024-01-15
Payer: COMMERCIAL

## 2024-01-15 VITALS
RESPIRATION RATE: 17 BRPM | OXYGEN SATURATION: 97 % | HEART RATE: 74 BPM | SYSTOLIC BLOOD PRESSURE: 146 MMHG | DIASTOLIC BLOOD PRESSURE: 65 MMHG | TEMPERATURE: 96.8 F

## 2024-01-15 DIAGNOSIS — Z86.010 PERSONAL HISTORY OF COLONIC POLYPS: Primary | ICD-10-CM

## 2024-01-15 LAB
GLUCOSE BLD MANUAL STRIP-MCNC: 104 MG/DL (ref 74–99)
GLUCOSE BLD MANUAL STRIP-MCNC: 106 MG/DL (ref 74–99)

## 2024-01-15 PROCEDURE — 45385 COLONOSCOPY W/LESION REMOVAL: CPT | Performed by: INTERNAL MEDICINE

## 2024-01-15 PROCEDURE — 3700000002 HC GENERAL ANESTHESIA TIME - EACH INCREMENTAL 1 MINUTE

## 2024-01-15 PROCEDURE — 82947 ASSAY GLUCOSE BLOOD QUANT: CPT

## 2024-01-15 PROCEDURE — 7100000010 HC PHASE TWO TIME - EACH INCREMENTAL 1 MINUTE

## 2024-01-15 PROCEDURE — 88305 TISSUE EXAM BY PATHOLOGIST: CPT | Mod: TC,SUR,AHULAB | Performed by: INTERNAL MEDICINE

## 2024-01-15 PROCEDURE — 88305 TISSUE EXAM BY PATHOLOGIST: CPT | Performed by: STUDENT IN AN ORGANIZED HEALTH CARE EDUCATION/TRAINING PROGRAM

## 2024-01-15 PROCEDURE — A45380 PR COLONOSCOPY,BIOPSY: Performed by: ANESTHESIOLOGY

## 2024-01-15 PROCEDURE — 2500000004 HC RX 250 GENERAL PHARMACY W/ HCPCS (ALT 636 FOR OP/ED): Performed by: NURSE ANESTHETIST, CERTIFIED REGISTERED

## 2024-01-15 PROCEDURE — 3700000001 HC GENERAL ANESTHESIA TIME - INITIAL BASE CHARGE

## 2024-01-15 PROCEDURE — 7100000009 HC PHASE TWO TIME - INITIAL BASE CHARGE

## 2024-01-15 PROCEDURE — A45380 PR COLONOSCOPY,BIOPSY: Performed by: NURSE ANESTHETIST, CERTIFIED REGISTERED

## 2024-01-15 RX ORDER — PROPOFOL 10 MG/ML
INJECTION, EMULSION INTRAVENOUS CONTINUOUS PRN
Status: DISCONTINUED | OUTPATIENT
Start: 2024-01-15 | End: 2024-01-15

## 2024-01-15 RX ORDER — SODIUM CHLORIDE, SODIUM LACTATE, POTASSIUM CHLORIDE, CALCIUM CHLORIDE 600; 310; 30; 20 MG/100ML; MG/100ML; MG/100ML; MG/100ML
20 INJECTION, SOLUTION INTRAVENOUS CONTINUOUS
Status: CANCELLED | OUTPATIENT
Start: 2024-01-15

## 2024-01-15 RX ORDER — SODIUM CHLORIDE, SODIUM LACTATE, POTASSIUM CHLORIDE, CALCIUM CHLORIDE 600; 310; 30; 20 MG/100ML; MG/100ML; MG/100ML; MG/100ML
INJECTION, SOLUTION INTRAVENOUS CONTINUOUS PRN
Status: DISCONTINUED | OUTPATIENT
Start: 2024-01-15 | End: 2024-01-15

## 2024-01-15 RX ORDER — PROPOFOL 10 MG/ML
INJECTION, EMULSION INTRAVENOUS AS NEEDED
Status: DISCONTINUED | OUTPATIENT
Start: 2024-01-15 | End: 2024-01-15

## 2024-01-15 RX ADMIN — SODIUM CHLORIDE, POTASSIUM CHLORIDE, SODIUM LACTATE AND CALCIUM CHLORIDE: 600; 310; 30; 20 INJECTION, SOLUTION INTRAVENOUS at 13:47

## 2024-01-15 RX ADMIN — PROPOFOL 150 MCG/KG/MIN: 10 INJECTION, EMULSION INTRAVENOUS at 13:54

## 2024-01-15 RX ADMIN — PROPOFOL 30 MG: 10 INJECTION, EMULSION INTRAVENOUS at 13:56

## 2024-01-15 RX ADMIN — PROPOFOL 30 MG: 10 INJECTION, EMULSION INTRAVENOUS at 13:54

## 2024-01-15 ASSESSMENT — COLUMBIA-SUICIDE SEVERITY RATING SCALE - C-SSRS
1. IN THE PAST MONTH, HAVE YOU WISHED YOU WERE DEAD OR WISHED YOU COULD GO TO SLEEP AND NOT WAKE UP?: NO
6. HAVE YOU EVER DONE ANYTHING, STARTED TO DO ANYTHING, OR PREPARED TO DO ANYTHING TO END YOUR LIFE?: NO
2. HAVE YOU ACTUALLY HAD ANY THOUGHTS OF KILLING YOURSELF?: NO

## 2024-01-15 ASSESSMENT — PAIN SCALES - GENERAL
PAINLEVEL_OUTOF10: 0 - NO PAIN
PAINLEVEL_OUTOF10: 0 - NO PAIN
PAIN_LEVEL: 0
PAINLEVEL_OUTOF10: 0 - NO PAIN

## 2024-01-15 ASSESSMENT — PAIN - FUNCTIONAL ASSESSMENT
PAIN_FUNCTIONAL_ASSESSMENT: 0-10

## 2024-01-15 NOTE — DISCHARGE INSTRUCTIONS

## 2024-01-15 NOTE — ANESTHESIA PREPROCEDURE EVALUATION
Patient: Nicolas Shultz    Procedure Information       Date/Time: 01/15/24 1300    Scheduled providers: Edinson Bass MD; Carlito Hough MD; JORDIN Calzada-YUMIKO; Tracy Graham RN    Procedure: COLONOSCOPY    Location: [facility]            Relevant Problems   Anesthesia (within normal limits)      Cardiovascular   (+) Aortic stenosis, mild   (+) Atherosclerosis of coronary artery   (+) Atypical chest pain   (+) Bicuspid aortic valve   (+) Carotid atherosclerosis   (+) Hyperlipidemia   (+) Hypertension   (+) Mild aortic insufficiency   (+) Right-sided carotid artery disease (CMS/HCC)   (+) Stable angina      Endocrine   (+) Exogenous obesity   (+) Morbid obesity (CMS/HCC)   (+) PDR (proliferative diabetic retinopathy) (CMS/HCC)      /Renal   (+) Chronic renal insufficiency   (+) Stage 4 chronic kidney disease (CMS/HCC)      Neuro/Psych   (+) Carotid atherosclerosis   (+) Peripheral neuropathy   (+) Right-sided carotid artery disease (CMS/HCC)      Pulmonary  2022 Nov:  Covid  Remote bronchitis   (+) Obstructive sleep apnea (Noncompliant w CPAP)      Hematology   (+) Anemia      Infectious Disease   (+) Disease due to severe acute respiratory syndrome coronavirus 2 (SARS-CoV-2)      Other   (+) Arthritis of big toe   (+) Arthritis of left knee       Clinical information reviewed:                   No data recorded       Past Medical History:   Diagnosis Date    Anterior scleritis, 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    Anterior scleritis of right eye    Anterior scleritis, right eye 06/05/2015    Anterior scleritis of right eye    Elevated blood-pressure reading, without diagnosis of hypertension 10/30/2013    Elevated blood pressure reading without diagnosis of hypertension    Encounter for screening for malignant neoplasm of rectum     Encounter for screening for malignant neoplasm of rectum    Glaucoma secondary to other eye disorders, unspecified eye, stage unspecified 07/09/2014    NVG (neovascular  glaucoma)    Male erectile dysfunction, unspecified     Inability to attain erection    Obstructive sleep apnea (adult) (pediatric)     Obstructive sleep apnea    Ocular hypertension, unspecified eye 10/30/2014    Elevated IOP    Old myocardial infarction 09/01/2016    History of myocardial infarction    Other obesity due to excess calories     Exogenous obesity    Personal history of diseases of the blood and blood-forming organs and certain disorders involving the immune mechanism     History of anemia    Personal history of other diseases of the circulatory system 02/17/2015    History of hypertension    Personal history of other diseases of the circulatory system 02/17/2015    History of arteriosclerotic cardiovascular disease    Personal history of other diseases of the nervous system and sense organs 02/28/2014    History of retinal detachment    Personal history of other diseases of the respiratory system     History of pleural effusion    Personal history of other diseases of urinary system     History of renal insufficiency syndrome    Personal history of other endocrine, nutritional and metabolic disease     History of hyperlipidemia    Personal history of other specified conditions     History of chest pain      Past Surgical History:   Procedure Laterality Date    CORONARY ANGIOPLASTY WITH STENT PLACEMENT  02/17/2015    CORONARY ANGIOPLASTY WITH STENT PLACEMENT  12/17/2014    FEMUR FRACTURE SURGERY  02/28/2014    LUNG SURGERY  02/28/2014    RETINAL DETACHMENT SURGERY  02/28/2014     Social History     Tobacco Use    Smoking status: Never    Smokeless tobacco: Never   Substance Use Topics    Alcohol use: Not Currently    Drug use: Never      Current Outpatient Medications   Medication Instructions    amLODIPine (NORVASC) 10 mg, oral, Daily    aspirin 81 mg, oral, Daily    atorvastatin (LIPITOR) 40 mg, oral, Daily    B complex-vitamin C-folic acid (Josy-Jesus) 0.8 mg tablet 0.8 mg, oral, Daily     "cholecalciferol (VITAMIN D-3) 25 mcg, oral, Daily    empagliflozin (JARDIANCE) 10 mg, oral, Daily    fenofibrate (Triglide) 160 mg tablet 1 tablet, oral, Daily    FreeStyle Lite Strips strip Daily    gabapentin (NEURONTIN) 300 mg, oral, 3 times daily    hydrALAZINE (APRESOLINE) 25 mg, oral, 3 times daily    icosapent ethyL (Vascepa) 1 gram capsule oral, 2 times daily with meals    insulin glargine-yfgn (SEMGLEE(INSULIN GLARG-YFGN)PEN) 70 Units, subcutaneous, Nightly    insulin lispro (HUMALOG KWIKPEN INSULIN) 80 Units, subcutaneous, Daily    levothyroxine (SYNTHROID, LEVOXYL) 25 mcg, oral, Daily    lisinopril 40 mg, oral, Daily    losartan (COZAAR) 50 mg, oral, Daily    metoprolol tartrate (LOPRESSOR) 50 mg, oral, 2 times daily    rosuvastatin (CRESTOR) 40 mg, oral, Nightly    SITagliptin phosphate (JANUVIA) 50 mg, oral, Daily    torsemide (DEMADEX) 20 mg, oral, Daily    Trulicity 4.5 mg, subcutaneous, Weekly      Allergies   Allergen Reactions    Penicillins Hives, Rash and Shortness of breath        Chemistry    Lab Results   Component Value Date/Time     06/14/2023 1119    K 4.3 06/14/2023 1119     06/14/2023 1119    CO2 29 06/14/2023 1119    BUN 37 (H) 06/14/2023 1119    CREATININE 2.45 (H) 06/14/2023 1119    Lab Results   Component Value Date/Time    CALCIUM 9.9 06/14/2023 1119    ALKPHOS 32 (L) 05/31/2022 0719    AST 25 05/31/2022 0719    ALT 21 06/14/2023 1119    BILITOT 0.6 05/31/2022 0719          Lab Results   Component Value Date/Time    WBC 6.5 05/31/2022 0719    HGB 13.0 (L) 05/31/2022 0719    HCT 40.7 (L) 05/31/2022 0719     05/31/2022 0719     No results found for: \"PROTIME\", \"PTT\", \"INR\"  No results found for this or any previous visit (from the past 4464 hour(s)).  No results found for this or any previous visit from the past 1095 days.   Echo 10/2/8/2022:  Left Ventricle: The left ventricular systolic function is normal, with an estimated ejection fraction of 55-60%. There are no " regional wall motion abnormalities. The left ventricular cavity size is normal. The left ventricular septal wall thickness is mildly increased. There is mildly increased left ventricular posterior wall thickness. There is left ventricular concentric remodeling. Spectral Doppler shows an impaired relaxation pattern of left ventricular diastolic filling. There is an elevated mean left atrial pressure.  Left Atrium: The left atrium is normal in size.  Right Ventricle: The right ventricle is normal in size. There is normal right ventricular global systolic function.  Right Atrium: The right atrium is normal in size.  Aortic Valve: The aortic valve appears abnormal. The number of aortic cusps could not be determined from this study. There is moderate aortic valve cusp calcification. There is evidence of moderate aortic valve stenosis.  The aortic valve dimensionless index is 0.33. The peak aortic velocity was obtained from the right supraclavicular view. There is moderate aortic valve regurgitation. The peak instantaneous gradient of the aortic valve is 46.4 mmHg. The mean gradient of the aortic valve is 24.1 mmHg.  Mitral Valve: The mitral valve is normal in structure. There is mild to moderate mitral annular calcification. There is trace mitral valve regurgitation.  Tricuspid Valve: The tricuspid valve is structurally normal. There is trace tricuspid regurgitation. The right ventricular systolic pressure is unable to be estimated.  Pulmonic Valve: The pulmonic valve is structurally normal. There is trace pulmonic valve regurgitation.  Pericardium: There is no pericardial effusion noted.  Aorta: The aortic root is normal. There is mild dilatation of the ascending aorta. The aortic root is at the upper limits of normal size.  Systemic Veins: The inferior vena cava appears to be of normal size. There is IVC inspiratory collapse greater than 50%.  In comparison to the previous echocardiogram(s): Compared with study from  2/6/2018, the peak and mean aortic gradients have increased (were 36 and 17 mmHg), although the DI is increaed (was 0.299).        CONCLUSIONS:   1. Left ventricular systolic function is normal with a 55-60% estimated ejection fraction.   2. Spectral Doppler shows an impaired relaxation pattern of left ventricular diastolic filling.   3. There is an elevated mean left atrial pressure.   4. Aortic valve appears abnormal.   5. Moderate aortic valve stenosis.   6. There is moderate aortic valve cusp calcification.   7. Moderate aortic valve regurgitation.    Visit Vitals  Smoking Status Never        Physical Exam    Airway  Mallampati: III     Cardiovascular    Dental    Pulmonary    Abdominal             Anesthesia Plan    History of general anesthesia?: yes  History of complications of general anesthesia?: no    ASA 3     MAC     intravenous induction   Anesthetic plan and risks discussed with patient.

## 2024-01-15 NOTE — ANESTHESIA POSTPROCEDURE EVALUATION
Patient: Nicolas Shultz    Procedure Summary       Date: 01/15/24 Room / Location: [facility]    Anesthesia Start: 1347 Anesthesia Stop: 1428    Procedure: COLONOSCOPY Diagnosis: Personal history of colonic polyps    Scheduled Providers: Edinson Bass MD; Carlito Hough MD; JORDIN Calzada-YUMIKO; Tracy Graham RN Responsible Provider: Kevin Renee MD    Anesthesia Type: MAC ASA Status: 3            Anesthesia Type: MAC    Vitals Value Taken Time   /62 01/15/24 1426   Temp 36 °C (96.8 °F) 01/15/24 1426   Pulse 76 01/15/24 1426   Resp 18 01/15/24 1426   SpO2 95 % 01/15/24 1426       Anesthesia Post Evaluation    Patient location during evaluation: bedside  Patient participation: complete - patient cannot participate  Level of consciousness: awake  Pain score: 0  Pain management: adequate  Airway patency: patent  Cardiovascular status: acceptable  Respiratory status: acceptable  Hydration status: acceptable  Postoperative Nausea and Vomiting: none        No notable events documented.

## 2024-01-15 NOTE — H&P
GASTROENTEROLOGY PRE-PROCEDURE H&P    HPI:  Nicolas Shultz is a 63 y.o. male here for surveillance colonoscopy for history of adenomatous colon polyps.    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    Anterior scleritis of right eye    Aortic stenosis     CAD S/P percutaneous coronary angioplasty     CKD (chronic kidney disease)     Diabetes mellitus (CMS/HCC)     Glaucoma secondary to other eye disorders, unspecified eye, stage unspecified     NVG (neovascular glaucoma)    HTN (hypertension)     Hyperlipidemia     Hypothyroidism     Male erectile dysfunction, unspecified     Obstructive sleep apnea (adult) (pediatric)     Ocular hypertension, unspecified eye     Elevated IOP    Old myocardial infarction        PAST SURGICAL HISTORY  Past Surgical History:   Procedure Laterality Date    COLONOSCOPY      CORONARY ANGIOPLASTY WITH STENT PLACEMENT  2009    LAD    CORONARY ANGIOPLASTY WITH STENT PLACEMENT      FEMUR FRACTURE SURGERY      LUNG SURGERY      RETINAL DETACHMENT SURGERY         FAMILY HISTORY  Family History   Problem Relation Name Age of Onset    Skin cancer Mother      Sleep apnea Father      Restless legs syndrome Father      Depression Brother         SOCIAL HISTORY  Social History     Tobacco Use    Smoking status: Never    Smokeless tobacco: Never   Substance Use Topics    Alcohol use: Not Currently       REVIEW OF SYSTEMS  A 10+ point review of systems was completed and was otherwise negative.    ALLERGIES  Allergies   Allergen Reactions    Penicillins Hives, Rash and Shortness of breath       MEDICATIONS  Current Outpatient Medications   Medication Instructions    amLODIPine (NORVASC) 10 mg, oral, Daily    aspirin 81 mg, oral, Daily    atorvastatin (LIPITOR) 40 mg, oral, Daily    B complex-vitamin C-folic acid (Josy-Jesus) 0.8 mg tablet 0.8 mg, oral, Daily    cholecalciferol (VITAMIN D-3) 25 mcg, oral, Daily    empagliflozin (JARDIANCE)  10 mg, oral, Daily    fenofibrate (Triglide) 160 mg tablet 1 tablet, oral, Daily    FreeStyle Lite Strips strip Daily    gabapentin (NEURONTIN) 300 mg, oral, 3 times daily    hydrALAZINE (APRESOLINE) 25 mg, oral, 3 times daily    icosapent ethyL (Vascepa) 1 gram capsule oral, 2 times daily with meals    insulin glargine-yfgn (SEMGLEE(INSULIN GLARG-YFGN)PEN) 70 Units, subcutaneous, Nightly    insulin lispro (HUMALOG KWIKPEN INSULIN) 80 Units, subcutaneous, Daily    levothyroxine (SYNTHROID, LEVOXYL) 25 mcg, oral, Daily    lisinopril 40 mg, oral, Daily    losartan (COZAAR) 50 mg, oral, Daily    metoprolol tartrate (LOPRESSOR) 50 mg, oral, 2 times daily    rosuvastatin (CRESTOR) 40 mg, oral, Nightly    SITagliptin phosphate (JANUVIA) 50 mg, oral, Daily    torsemide (DEMADEX) 20 mg, oral, Daily    Trulicity 4.5 mg, subcutaneous, Weekly       VITALS  /61   Pulse 78   Temp 36.1 °C (97 °F) (Tympanic)   Resp 17   SpO2 94%      PHYSICAL EXAM  CONSTITUTIONAL: NAD  PULMONARY: CTAB  CARDIOVASCULAR: RRR  ABDOMEN: soft, NTND  NEUROLOGIC: AOx3    ASSESSMENT/PLAN    Proceed with colonoscopy as scheduled.    Signature: Edinson Bass MD

## 2024-01-16 ASSESSMENT — PAIN SCALES - GENERAL: PAINLEVEL_OUTOF10: 0 - NO PAIN

## 2024-01-17 ENCOUNTER — TELEMEDICINE (OUTPATIENT)
Dept: OTOLARYNGOLOGY | Facility: CLINIC | Age: 64
End: 2024-01-17
Payer: COMMERCIAL

## 2024-01-17 DIAGNOSIS — R43.8 HYPOSMIA: Primary | ICD-10-CM

## 2024-01-17 PROCEDURE — 99441 PR PHYS/QHP TELEPHONE EVALUATION 5-10 MIN: CPT | Performed by: NURSE PRACTITIONER

## 2024-01-17 NOTE — PATIENT INSTRUCTIONS
Today you were evaluated by Jazzy Doan CNP.    Please follow-up as needed. If you have any questions or concerns, please contact my office at [phone].

## 2024-01-17 NOTE — PROGRESS NOTES
Subjective   Patient ID: Nicolas Shultz is a 63 y.o. male who presents for No chief complaint on file..  HPI  Nicolas Shultz is a 63 y.o. 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 Most likely cause is a viral insult to the olfactory nerves versus a fume injury to the smell nerves. Rec. Smell retraining therapy.   1/17/24- TELEHEALTH- Patient presents for follow-up visit. He notes that he has had near complete resolution to his smell issues since last visit. He has continued the gabapentin that was initially prescribed by Dr. Ortiz. He is no longer doing smell retraining therapy. He is planning to stay on the gabapentin as long as he is seeing benefit. He had a temporary lapse in time off the gabapentin and did have issues with his smell during this time.     I have also reviewed prior note from Dr. Inocente Moralez dated 1/3/24 and this is contributing to my history and assessment.     Per prior note:   Nicolas Shultz  is a 63 y.o. old  male who presents today for evaluation of difficulty with smell for 1 year in duration. Specifically, the patient reports a sudden change in his sense of smell. He notes that things such as coffee, exhaust, gasoline, carry a sulfur/acid smell to them. He reports that this occurred suddenly. He wonders if use of gabapentin has contributed to some improvement of this (he has been on about a month). He denies difficulty with his sense of taste. He denies sinus disease, recent URI, head trauma, exposure to toxic fumes. Patient denies facial pressure, facial pain, periorbital edema, epiphora, and epistaxis. Does not have a history of allergic rhinitis or allergies. They deny anything like this occurring in the past. The patient has taken gabapentin, flonase to help alleviate the problem. This has improved the symptoms. Symptoms did occur about 1.5 weeks prior to a COVID infection.    I have also reviewed prior note from  Dr. Praveen Crandall dated 6/13/23, 7/31/23  and this is contributing to my history and assessment.     I personally reviewed the patients CT scan images and results. I discussed the results personally with the patient. The following findings were discussed: 6/29/23: MRI Brain: Septum midline. Scattered inflammation through the ethmoid air cells. Otherwise, paranasal sinuses are clear. No obvious abnormalities along the skull base, near the olfactory nerves.        Review of Systems  Review of systems is negative for constitutional, ophthalmological, cardiac, pulmonary, renal, gastrointestinal, musculoskeletal, mental health, endocrine, or neurologic disorders (except as listed in the HPI, PMH, and Problem List).     Objective   Physical Exam    Assessment/Plan     Assessment:  Nicolas Shultz is a 63 y.o. 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 Most likely cause is a viral insult to the olfactory nerves versus a fume injury to the smell nerves. Rec. Smell retraining therapy.   1/17/24- TELEHEALTH- Improved sense of smell with gabapentin. Will continue with PCP.    Plan:  Reassurance and counseling provided to patient: We discussed that the most common reasons for alterations or loss of sense of smell/taste are due to idiopathic causes and chronic sinusitis with nasal polyps. Today the patient had no evidence of nasal polyps.    I personally reviewed the patients CT scan images and results. I discussed the results personally with the patient. The following findings were discussed: 6/29/23: MRI Brain: Septum midline. Scattered inflammation through the ethmoid air cells. Otherwise, paranasal sinuses are clear. No obvious abnormalities along the skull base, near the olfactory nerves.    1. The patient is having benefit with use of gabapentin. This is not something I typically offer for smell related injuries and I recommended that he follow up with either his PCP  or neurology for continuation.  2. Patient will follow-up with me as needed. Patient verbalizes understanding instructions and agrees with established plan of care.     7 minutes was spent on this patient's visit. More than 50% of that time was spent in counseling regarding the possible etiologies, test results, treatment options and coordinating care.

## 2024-01-21 LAB
LABORATORY COMMENT REPORT: NORMAL
PATH REPORT.FINAL DX SPEC: NORMAL
PATH REPORT.GROSS SPEC: NORMAL
PATH REPORT.TOTAL CANCER: NORMAL

## 2024-02-07 ENCOUNTER — APPOINTMENT (OUTPATIENT)
Dept: NEPHROLOGY | Facility: CLINIC | Age: 64
End: 2024-02-07
Payer: COMMERCIAL

## 2024-02-07 DIAGNOSIS — N18.4 STAGE 4 CHRONIC KIDNEY DISEASE (MULTI): Primary | ICD-10-CM

## 2024-02-09 ENCOUNTER — LAB (OUTPATIENT)
Dept: LAB | Facility: LAB | Age: 64
End: 2024-02-09
Payer: COMMERCIAL

## 2024-02-09 DIAGNOSIS — N18.4 STAGE 4 CHRONIC KIDNEY DISEASE (MULTI): ICD-10-CM

## 2024-02-09 LAB
25(OH)D3 SERPL-MCNC: 26 NG/ML (ref 30–100)
ALBUMIN SERPL BCP-MCNC: 4.1 G/DL (ref 3.4–5)
ANION GAP SERPL CALC-SCNC: 16 MMOL/L (ref 10–20)
BUN SERPL-MCNC: 36 MG/DL (ref 6–23)
CALCIUM SERPL-MCNC: 9.8 MG/DL (ref 8.6–10.6)
CHLORIDE SERPL-SCNC: 103 MMOL/L (ref 98–107)
CO2 SERPL-SCNC: 28 MMOL/L (ref 21–32)
CREAT SERPL-MCNC: 2.61 MG/DL (ref 0.5–1.3)
CREAT UR-MCNC: 53.5 MG/DL (ref 20–370)
EGFRCR SERPLBLD CKD-EPI 2021: 27 ML/MIN/1.73M*2
ERYTHROCYTE [DISTWIDTH] IN BLOOD BY AUTOMATED COUNT: 12.7 % (ref 11.5–14.5)
FERRITIN SERPL-MCNC: 53 NG/ML (ref 20–300)
GLUCOSE SERPL-MCNC: 173 MG/DL (ref 74–99)
HCT VFR BLD AUTO: 41.4 % (ref 41–52)
HGB BLD-MCNC: 13 G/DL (ref 13.5–17.5)
IRON SATN MFR SERPL: 20 % (ref 25–45)
IRON SERPL-MCNC: 88 UG/DL (ref 35–150)
MCH RBC QN AUTO: 29.2 PG (ref 26–34)
MCHC RBC AUTO-ENTMCNC: 31.4 G/DL (ref 32–36)
MCV RBC AUTO: 93 FL (ref 80–100)
MICROALBUMIN UR-MCNC: 490.9 MG/L
MICROALBUMIN/CREAT UR: 917.6 UG/MG CREAT
NRBC BLD-RTO: 0 /100 WBCS (ref 0–0)
PHOSPHATE SERPL-MCNC: 4 MG/DL (ref 2.5–4.9)
PLATELET # BLD AUTO: 253 X10*3/UL (ref 150–450)
POTASSIUM SERPL-SCNC: 4.9 MMOL/L (ref 3.5–5.3)
PTH-INTACT SERPL-MCNC: 52.7 PG/ML (ref 18.5–88)
RBC # BLD AUTO: 4.45 X10*6/UL (ref 4.5–5.9)
SODIUM SERPL-SCNC: 142 MMOL/L (ref 136–145)
TIBC SERPL-MCNC: 439 UG/DL (ref 240–445)
UIBC SERPL-MCNC: 351 UG/DL (ref 110–370)
WBC # BLD AUTO: 5.4 X10*3/UL (ref 4.4–11.3)

## 2024-02-09 PROCEDURE — 82043 UR ALBUMIN QUANTITATIVE: CPT

## 2024-02-09 PROCEDURE — 36415 COLL VENOUS BLD VENIPUNCTURE: CPT

## 2024-02-09 PROCEDURE — 85027 COMPLETE CBC AUTOMATED: CPT

## 2024-02-09 PROCEDURE — 80069 RENAL FUNCTION PANEL: CPT

## 2024-02-09 PROCEDURE — 83540 ASSAY OF IRON: CPT

## 2024-02-09 PROCEDURE — 82570 ASSAY OF URINE CREATININE: CPT

## 2024-02-09 PROCEDURE — 82728 ASSAY OF FERRITIN: CPT

## 2024-02-09 PROCEDURE — 82306 VITAMIN D 25 HYDROXY: CPT

## 2024-02-09 PROCEDURE — 83970 ASSAY OF PARATHORMONE: CPT

## 2024-02-09 PROCEDURE — 83550 IRON BINDING TEST: CPT

## 2024-02-12 ENCOUNTER — OFFICE VISIT (OUTPATIENT)
Dept: NEPHROLOGY | Facility: CLINIC | Age: 64
End: 2024-02-12
Payer: COMMERCIAL

## 2024-02-12 VITALS
TEMPERATURE: 97.1 F | HEART RATE: 80 BPM | HEIGHT: 71 IN | WEIGHT: 315 LBS | SYSTOLIC BLOOD PRESSURE: 110 MMHG | OXYGEN SATURATION: 96 % | BODY MASS INDEX: 44.1 KG/M2 | DIASTOLIC BLOOD PRESSURE: 64 MMHG | RESPIRATION RATE: 16 BRPM

## 2024-02-12 DIAGNOSIS — D50.8 OTHER IRON DEFICIENCY ANEMIA: ICD-10-CM

## 2024-02-12 DIAGNOSIS — R60.0 LOCALIZED EDEMA: ICD-10-CM

## 2024-02-12 DIAGNOSIS — I10 PRIMARY HYPERTENSION: ICD-10-CM

## 2024-02-12 DIAGNOSIS — N18.4 CHRONIC RENAL IMPAIRMENT, STAGE 4 (SEVERE) (MULTI): Primary | ICD-10-CM

## 2024-02-12 DIAGNOSIS — N06.0 ISOLATED PROTEINURIA WITH MINOR GLOMERULAR ABNORMALITY: ICD-10-CM

## 2024-02-12 PROCEDURE — 3078F DIAST BP <80 MM HG: CPT | Performed by: INTERNAL MEDICINE

## 2024-02-12 PROCEDURE — 3074F SYST BP LT 130 MM HG: CPT | Performed by: INTERNAL MEDICINE

## 2024-02-12 PROCEDURE — 3062F POS MACROALBUMINURIA REV: CPT | Performed by: INTERNAL MEDICINE

## 2024-02-12 PROCEDURE — 1036F TOBACCO NON-USER: CPT | Performed by: INTERNAL MEDICINE

## 2024-02-12 PROCEDURE — 99215 OFFICE O/P EST HI 40 MIN: CPT | Performed by: INTERNAL MEDICINE

## 2024-02-12 PROCEDURE — 4010F ACE/ARB THERAPY RXD/TAKEN: CPT | Performed by: INTERNAL MEDICINE

## 2024-02-12 NOTE — PROGRESS NOTES
Subjective       Nicolas Shultz is a 63 y.o. male who has past medical history of   uncontrolled DM with no known retinopathy, HTN, HLD, CAD s/p PCI, morbid obesity, LUCIANO not on CPAP, remote history of renal failure in 2008 due to NSAIDs, and retinal detachment who is coming today as 6 ms f/u for CKD    Last office visit August 2023.  Nicolas came alone today.  No kidney related complaints or concerns.  He is not very sure about his medication list as we noticed losartan and lisinopril are both on the list.  He will confirm with a call back when he goes home.  He had blood work done recently that showed stable serum creatinine 2.6 and GFR 27 and within normal electrolytes.  No significant anemia hemoglobin 13.  Protein leak is worsening after it was improving and now up to 917 mg/g.  He is adherent to Jardiance and losartan.  He is adherent to low-salt diet.  He admits to consuming animal-based protein.  Blood pressure is accepted with positive orthostatic hypotension-again.  Currently on small dose water pill torsemide 20 mg.  He has chronic leg swelling with no significant hypervolemia on exam.  Overall we discussed GFR preservation conservative measures    Prior notes     Last office visit January 2023. We discussed strict diabetes control. In the interim, Mr. Shultz started CGM with better control to A1c. He continues to feel dizzy when he stands up with orthostatic hypotension-he is planned to see cardiology soon. He had blood work done last month and results were discussed with him in details including stable serum creatinine 2.4 and GFR 29 in his baseline. Within normal electrolytes. Improved albuminuria 100 mg/g. Improved A1c 6.5. He is adherent to medications. Blood pressure is in target today with chronic orthostatic hypotension-symptomatic. He is currently on torsemide 20 mg daily     Per prior notes      Evaluated virtually today. Camera did not work in the visit. Phone  Last office visit September  2021. We started Jardiance in summer 2021. Repeat blood work in September 2021 showed slightly worsening serum creatinine and GFR was down to 21% from his baseline 25-30% which thought to be secondary to physiologic effect of SGLT2 inhibitor. In the interim, Nicolas continues to follow with endocrinology. He is working hard to get his diabetes under better control. Repeat blood work in December 2021 showed serum creatinine back to baseline 2.6, GFR 25 mils per minute per 1.73 mÂ². Protein creatinine ratio improved from 2.6 g--> 0.4 g.     Nicolas was evaluated virtually today. He feels fine in his baseline state of health. He is excited about stable GFR. All questions were answered. Results of blood work was discussed with him in details. He checks blood pressure at home and average reading 120/80     Per prior notes     Last office visit June 2021. In the interim, we started Jardiance for better diabetes control and improved renal outcome. He came alone today. He reports weight loss and feels lighter after starting SGL 2 inhibitors. Blood pressure is in target 120/80 when he checks at home. He denies lower urinary tract symptoms or foam or blood in urine. No NSAID use at home. He is worried about his kidney numbers. Repeat blood work showed slightly worsening serum creatinine and GFR (expected after starting Jardiance). On the other side, proteinuria/albuminuria improved. No specific complaints today. He is hoping that things will remain to be stable in the future. He continues to work with his diabetes doctor but diabetes is fluctuating and A1c is not in target     Per prior notes  Last office visit December 2020. In the interim, he remains to work on better diabetes control. Increase his insulin. A1c dropped from 14-9. He is trying to drink more fluid and limit salt in diet. He checks blood pressure at home and average reading 120/80. Sometimes diastolic readings are in the 60s. He reports making good amount of  urine with no lower urinary tract symptoms. No blood or foam in the urine. No leg swelling or shortness of breath. He remains to be on losartan in addition to the rest of his blood pressure medication. He is due for blood work. He still did not start his CPAP machine because he lost follow-up with the sleep lab team        Per prior notes  LOV - 9/2020, Scr 2.5, GFR 25%. He self stopped Lisnopril due to leg cramps. In the interim, he follows with sleep medicine team to get a new CPAP machine for LUCIANO. Repeat Blood work in 9/2020 showed improved SCr to 2.1, GFR 31. He reports infrequent BP checks at home ~ 130/90. Feels well. Admits bad eating habits due to the pandemic situation, DM is not well controlled and weight gain ~ 10 ibs in the last 3 months. He reports drinking plenty of fluid and makes good urine out put. No c/o today         Per prior notes     LOV - 6/3/2020 - We discussed life style changes and CKD in details. No changes in meds at this time. Most recent RFP in 2/2020 with Scr 2.5, GFR 25%. He did not get work up as planned prior to the visit today.   Today, he is coming alone. Reports stopping Lisinopril due to sever legs pain/cramp. Pain improved after holding Lisinopril. He checks BP at home ~ 120/80 off Lisnopril. Reports numbness in the feet and hands. He does not use CPAP as last time he used ot- 3 days later he developed retinal detachment and he thinks it is related. He is willing to re-evaluated for LUCIANO. Not taking Advil anymore. Reports making good UOP.            Per prior notes     Mr Shultz is aware of chronic kidney disease since 2008  He denies LUTs, blood in urine or foam   No more NSAIDs intake   He denies retinopathy   Check BP at home ~ 130-140/80-90s  No leg swelling   He only take Lisinopril 30 mgx1 daily         Social: non smoker, social ETOH  Fam Hx - irrelevant - no dialysis       Objective   /64 (BP Location: Right arm, Patient Position: Standing, BP Cuff Size: Large  "adult long)   Pulse 80   Temp 36.2 °C (97.1 °F)   Resp 16   Ht 1.803 m (5' 11\")   Wt 144 kg (317 lb 3.2 oz)   SpO2 96%   BMI 44.24 kg/m²   Wt Readings from Last 3 Encounters:   02/12/24 144 kg (317 lb 3.2 oz)   01/03/24 143 kg (316 lb)   12/21/23 144 kg (317 lb)       Physical Exam    General appearance: no distress awake and alert on room air, euvolemic on exam, obese  Eyes: non-icteric  HEENT: atrumatic head, PEERLA, moist mucosa  Skin: no apparent rash  Heart: NSR, S1, S2 normal, no murmur or gallop  Lungs: Symmetrical expansion,CTA bilat no wheezing/crackles  Abdomen: soft, nt/nd, obese  Extremities: Soft pitting edema bilateral  Neuro: No FND,asterixis, no focal deficits noticed        Review of Systems     Constitutional: no fever, no chills, no recent weight gain and no recent weight loss.   Eyes: no blurred vision and no diplopia.   ENT: no hearing loss, no earache, no sore throat, no swollen glands in the neck and no nasal discharge.   Cardiovascular: no chest pain, no palpitations and no lower extremity edema.   Respiratory: no shortness of breath, no chronic cough and no shortness of breath during exertion.   Gastrointestinal: no abdominal pain, no constipation, no heartburn, no vomiting, no bloody stools and no change in bowel movements.   Genitourinary: no dysuria and no hematuria.   Musculoskeletal: no arthralgias and no myalgias.   Skin: no rashes and no skin lesions.   Neurological: no headaches and no dizziness.   Psychiatric: no confusion, no depression and no anxiety.   Endocrine: no heat intolerance, no cold intolerance, appetite not increased, no thyroid disorder, no increased urinary frequency and no dry skin.   Hematologic/Lymphatic: does not bleed easily and does not bruise easily.   All other systems have been reviewed and are negative for complaint.         Data Review        Results from last 7 days   Lab Units 02/09/24  0908   WBC AUTO x10*3/uL 5.4   HEMOGLOBIN g/dL 13.0* " "  HEMATOCRIT % 41.4   PLATELETS AUTO x10*3/uL 253            No results found for: \"URICACID\"        Lab Results   Component Value Date    HGBA1C 6.7 (A) 12/21/2023           Results from last 7 days   Lab Units 02/09/24  0908   SODIUM mmol/L 142   POTASSIUM mmol/L 4.9   CHLORIDE mmol/L 103   CO2 mmol/L 28   BUN mg/dL 36*   GLUCOSE mg/dL 173*   CALCIUM mg/dL 9.8   ANION GAP mmol/L 16   EGFR mL/min/1.73m*2 27*           Albumin/Creatine Ratio   Date Value Ref Range Status   02/09/2024 917.6 (H) <30.0 ug/mg Creat Final   06/14/2023 107.3 (H) 0.0 - 30.0 ug/mg crt Final   12/28/2021 198.4 (H) 0.0 - 30.0 ug/mg crt Final            RFP  Recent Labs     02/09/24  0908 06/14/23  1119 05/31/22  0719 03/08/22  0800 12/28/21  0858 09/14/21  0737 06/15/21  1512 12/18/20  0738 09/15/20  1508    142 141 141 140 136 133* 137 135*   K 4.9 4.3 4.7 4.9 4.8 4.8 5.0 4.5 4.6    102 106 103 102 100 97* 99 97*   CO2 28 29 24 26 28 25 28 28 28   BUN 36* 37* 31* 51* 39* 45* 36* 32* 24*   CREATININE 2.61* 2.45* 2.65* 2.97* 2.62* 3.07* 2.54* 2.18* 2.16*   GLUCOSE 173* 108* 98 186* 173* 261* 290* 279* 331*   CALCIUM 9.8 9.9 9.4 9.6 10.3 9.4 9.3 9.3 9.7   PHOS 4.0  --   --   --  4.9 4.5 3.6 3.9 3.5   EGFR 27*  --   --   --   --   --   --   --   --    ANIONGAP 16 15 16 17 15 16 13 15 15        Urineanalysis  Recent Labs     05/31/22  0719 12/28/21  0858 06/15/21  1512 12/18/20  0738 09/15/20  1610 02/28/20  0746   COLORU STRAW  --  YELLOW  --   --  YELLOW   APPEARANCEU CLEAR  --  CLEAR  --   --  CLEAR   SPECGRAVU 1.008  --  1.009  --   --  1.013   NELSON 5.0  --  6.0  --   --  6.0   PROTUR NEGATIVE 22 76*  100(2+)* 119* 29* 30 (1+)*   GLUCOSEU >=500 (3+)*  --  >=500(3+)*  --   --  >=500 (3+)*   BLOODU NEGATIVE  --  NEGATIVE  --   --  NEGATIVE   KETONESU NEGATIVE  --  NEGATIVE  --   --  NEGATIVE   BILIRUBINU NEGATIVE  --  NEGATIVE  --   --  NEGATIVE   NITRITEU NEGATIVE  --  NEGATIVE  --   --  NEGATIVE   LEUKOCYTESU NEGATIVE  --  " NEGATIVE  --   --  NEGATIVE       Urine Electrolytes  Recent Labs     02/09/24  0908 06/14/23  1119 05/31/22  0719 12/28/21  0858 09/14/21  0737 06/15/21  1512 12/18/20  0738 09/15/20  1610 02/28/20  0746 10/02/19  0927   CREATU 53.5 64.4  --  51.5  51.5 52.4 54.4  54.4 44.4 27.5  --  39.0   PROTUR  --   --  NEGATIVE 22  --  76*  100(2+)* 119* 29* 30 (1+)*  --    UTPCR  --   --   --  0.43*  --  1.40* 2.68* 1.05*  --   --    ALBUMINUR 490.9  --   --   --   --   --   --   --   --   --    MICROALBCREA 917.6* 107.3*  --  198.4* 248.1* 877.0*  --   --   --  455.9*        Urine Micro  Recent Labs     06/15/21  1512 02/28/20  0746   WBCU <1 1   RBCU <1 <1   HYALCASTU  --  OCC*   SQUAMEPIU <1  --    MUCUSU FEW 1+        Iron  Recent Labs     02/09/24  0908 12/28/21  0858 06/15/21  1512 09/24/20  0731   IRON 88 90 116 85   TIBC 439 456* 392 382   IRONSAT 20* 20* 30 22*   FERRITIN 53 79 98 72          Current Outpatient Medications on File Prior to Visit   Medication Sig Dispense Refill    atorvastatin (Lipitor) 40 mg tablet Take 1 tablet (40 mg) by mouth once daily.      B complex-vitamin C-folic acid (Josy-Jesus) 0.8 mg tablet Take 1 tablet by mouth once daily.      cholecalciferol (Vitamin D-3) 25 MCG (1000 UT) capsule Take 1 capsule (25 mcg) by mouth once daily.      dulaglutide (Trulicity) 4.5 mg/0.5 mL pen injector Inject 4.5 mg under the skin 1 (one) time per week. 2 mL 3    empagliflozin (Jardiance) 10 mg Take 1 tablet (10 mg) by mouth once daily. 90 tablet 3    fenofibrate (Triglide) 160 mg tablet Take 1 tablet (160 mg) by mouth once daily.      FreeStyle Lite Strips strip once daily.      gabapentin (Neurontin) 300 mg capsule Take 1 capsule (300 mg) by mouth 3 times a day.      hydrALAZINE (Apresoline) 25 mg tablet Take 1 tablet (25 mg) by mouth 3 times a day.      icosapent ethyL (Vascepa) 1 gram capsule Take by mouth 2 times a day with meals.      insulin glargine-yfgn (Semglee,insulin glarg-yfgn,Pen) 100  unit/mL (3 mL) Pen Inject 70 Units under the skin once daily at bedtime. 90 mL 3    insulin lispro (HumaLOG KwikPen Insulin) 100 unit/mL injection Inject 80 Units under the skin once daily. 75 mL 3    levothyroxine (Synthroid, Levoxyl) 25 mcg tablet Take 1 tablet (25 mcg) by mouth once daily.      lisinopril 40 mg tablet Take 1 tablet (40 mg) by mouth once daily.      losartan (Cozaar) 50 mg tablet Take 1 tablet (50 mg) by mouth once daily. 90 tablet 3    rosuvastatin (Crestor) 40 mg tablet Take 1 tablet (40 mg) by mouth once daily at bedtime.      SITagliptin phosphate (Januvia) 50 mg tablet Take 1 tablet (50 mg) by mouth once daily.      torsemide (Demadex) 20 mg tablet Take 1 tablet (20 mg) by mouth once daily.      amLODIPine (Norvasc) 10 mg tablet Take 1 tablet (10 mg) by mouth once daily. 90 tablet 0    aspirin 81 mg EC tablet Take 1 tablet (81 mg) by mouth once daily.      metoprolol tartrate (Lopressor) 50 mg tablet Take 1 tablet (50 mg) by mouth 2 times a day. 180 tablet 0     No current facility-administered medications on file prior to visit.           Assessment and Plan       Nicolas Shultz  is a 63 y.o. male who has past medical history of  uncontrolled DM with no known retinopathy, HTN, HLD, CAD s/p PCI, morbid obesity, LUCIANO not on CPAP, remote history of renal failure in 2008 due to NSAIDs, and retinal detachment who is coming today as 3 ms f/u for CKD     Impression     # Chronic kidney disease - G4- A3 - likely DM/HTN/ASCVD and remote NSAIDs/ and prior unresolved CHEMA  - Baseline SCr 2-2.5 , GFR 25-30. Serum creatinine is 2.6, GFR 27 mils per minute per 1.73 mÂ²-at baseline  - [facility] urine analysis showed +1 protien in urine, no blood  - [place] dipstick in office earlier showed +G, +Prtn. PCR spot test in 9/2020 1 g/g with repeat in December 2020 up to 2.6 g/g (worsening proteinuria). He started losartan and Jardiance about 12 months ago. Repeat spot test albumin creatinine ratio is down to 100 mg/mg  in August 2023 which was improving.  Now, protein leak is worsening again up to 917 mg/g based on the most recent spot test ACR.  Discussed low-salt diet and low animal-based protein diet.  Will continue RAAS inhibitors and SGL 2 inhibitors  -Kidney ultrasound done in 2021 showed normal size kidneys bilateral about 11 cm with no significant blockage or stones  - Lyes : Accepted K, Na and no acidosis     We will continue to follow conservative measures. Today discussed with Mr. Shultz that controlling diabetes is essential to keep GFR stable. He got CGM inserted with better controlled his diabetes. I also encouraged him to address LUCIANO and start CPAP which might help with the CKD progression        # BP -well controlled in target today was positive orthostatic hypotension  - Current meds: Hydralazine 25 mg x3, Amlodipine 10 mg,, Metop and Torsemide 20 mg daily and losartan 50 mg.  He will confirm his medication list with us when he goes home           #Anemia Hb in target 13     #BMD   -Within normal calcium, phosphorus. And PTH. Vitamin D is low we started weekly vitamin D     # [facility]  - On statins-he requested refill-will alert his PCP  -On RAAS inhibitors and SGL 2 inhibitors. We will consider finerenone with follow-up        #Diabetes - HA1C 9.4 improved from prior and now down to 6.5  -On SGL 2 inhibitors  -Follows with endocrine. Needs strict control  -We will consider finerenone with follow-up        #LUCIANO counseled earlier regarding the significance of initiating CPAP machine     #Others  - No NSAIDs, no contrast as possible  - Ensure well hydration  - Limit salt in diet  - No smoking        Follow-up in 3 months.. Course seems to be stable at this time. Will need strict DM control and close nephrology follow up.         Antony Rodríguez MD, MS, MICHELLE ZAMUDIO  Clinical  - [facility] [employer]  Nephrologist - [facility] - [facility]

## 2024-02-12 NOTE — PATIENT INSTRUCTIONS
Dear MARYSOL   It was nice seeing you in the nephrology clinic today     Today we discussed the following:     # Chronic kidney disease (CKD) -stage 4 ~your baseline 25-30%-relatively stable current  - CKD is due to hypertension and diabetes and remote Advil use     # Hypertension -in accepted today. Dropped blood pressure when he stood up-try compression stockings. Currently you take torsemide 20 mg for leg swelling-will not be able to up titrate this as it might worsen your blood pressure drop        # Diabetes-uncontrolled but improving and currently in target    # Worsening protein leak in the urine     Plan  -Continue losartan and Jardiance for kidney protection  -Limit animal-based protein 3 times weekly instead of daily        - follow up in 3 months  with repeat blood work and urine analysis before visit        Please call our office if you have any question  Thank you for coming to see me today     Antony Rodríguez MD, MS, MICHELLE ZAMUDIO  Clinical  - [facility] [employer]  Nephrologist - [facility] - [facility]

## 2024-02-16 ENCOUNTER — OFFICE VISIT (OUTPATIENT)
Dept: CARDIOLOGY | Facility: CLINIC | Age: 64
End: 2024-02-16
Payer: COMMERCIAL

## 2024-02-16 ENCOUNTER — HOSPITAL ENCOUNTER (OUTPATIENT)
Dept: CARDIOLOGY | Facility: CLINIC | Age: 64
Discharge: HOME | End: 2024-02-16
Payer: COMMERCIAL

## 2024-02-16 VITALS
DIASTOLIC BLOOD PRESSURE: 75 MMHG | HEART RATE: 73 BPM | HEIGHT: 71 IN | BODY MASS INDEX: 43.76 KG/M2 | WEIGHT: 312.56 LBS | OXYGEN SATURATION: 94 % | SYSTOLIC BLOOD PRESSURE: 146 MMHG

## 2024-02-16 DIAGNOSIS — I25.10 ATHEROSCLEROSIS OF NATIVE CORONARY ARTERY WITHOUT ANGINA PECTORIS, UNSPECIFIED WHETHER NATIVE OR TRANSPLANTED HEART: ICD-10-CM

## 2024-02-16 DIAGNOSIS — Q23.1 BICUSPID AORTIC VALVE (HHS-HCC): ICD-10-CM

## 2024-02-16 DIAGNOSIS — I70.0 AORTIC ATHEROSCLEROSIS (CMS-HCC): Primary | ICD-10-CM

## 2024-02-16 LAB
AORTIC VALVE MEAN GRADIENT: 23.6 MMHG
AORTIC VALVE PEAK VELOCITY: 3.14 M/S
AV PEAK GRADIENT: 39.3 MMHG
AVA (PEAK VEL): 1.03 CM2
AVA (VTI): 1.11 CM2
EJECTION FRACTION APICAL 4 CHAMBER: 53.4
EJECTION FRACTION: 57 %
LEFT ATRIUM VOLUME AREA LENGTH INDEX BSA: 29.9 ML/M2
LEFT VENTRICLE INTERNAL DIMENSION DIASTOLE: 5.08 CM (ref 3.5–6)
LEFT VENTRICULAR OUTFLOW TRACT DIAMETER: 2.07 CM
MITRAL VALVE E/A RATIO: 0.72
MITRAL VALVE E/E' RATIO: 8.66
RIGHT VENTRICLE FREE WALL PEAK S': 15 CM/S
RIGHT VENTRICLE PEAK SYSTOLIC PRESSURE: 40.7 MMHG
TRICUSPID ANNULAR PLANE SYSTOLIC EXCURSION: 2.2 CM

## 2024-02-16 PROCEDURE — 3077F SYST BP >= 140 MM HG: CPT | Performed by: INTERNAL MEDICINE

## 2024-02-16 PROCEDURE — 99214 OFFICE O/P EST MOD 30 MIN: CPT | Performed by: INTERNAL MEDICINE

## 2024-02-16 PROCEDURE — 4010F ACE/ARB THERAPY RXD/TAKEN: CPT | Performed by: INTERNAL MEDICINE

## 2024-02-16 PROCEDURE — 1036F TOBACCO NON-USER: CPT | Performed by: INTERNAL MEDICINE

## 2024-02-16 PROCEDURE — 93306 TTE W/DOPPLER COMPLETE: CPT | Performed by: INTERNAL MEDICINE

## 2024-02-16 PROCEDURE — 93306 TTE W/DOPPLER COMPLETE: CPT

## 2024-02-16 PROCEDURE — 3062F POS MACROALBUMINURIA REV: CPT | Performed by: INTERNAL MEDICINE

## 2024-02-16 PROCEDURE — 3078F DIAST BP <80 MM HG: CPT | Performed by: INTERNAL MEDICINE

## 2024-02-16 ASSESSMENT — PAIN SCALES - GENERAL: PAINLEVEL: 0-NO PAIN

## 2024-02-16 NOTE — PROGRESS NOTES
Primary Care Physician: Alejandro Beckford MD  Date of Visit: 02/16/2024 11:40 AM EST  Location of [place]: [facility] [place] [address]     Chief Complaint:   No chief complaint on file.       HPI / Summary:   Nicolas Shultz is a 63 y.o. male presents for followup.     For diagnosis of aortic stenosis and AI preliminary review of the echo done today showed no significant change in aortic valve gradient LV function or degree of aortic regurgitation or aortic stenosis    LAD stent 2009, circumflex in 2015.  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    Anterior scleritis of right eye    Aortic stenosis     CAD S/P percutaneous coronary angioplasty     CKD (chronic kidney disease)     Diabetes mellitus (CMS/HCC)     Glaucoma secondary to other eye disorders, unspecified eye, stage unspecified     NVG (neovascular glaucoma)    HTN (hypertension)     Hyperlipidemia     Hypothyroidism     Male erectile dysfunction, unspecified     Obstructive sleep apnea (adult) (pediatric)     Ocular hypertension, unspecified eye     Elevated IOP    Old myocardial infarction           Past Surgical History:   Procedure Laterality Date    COLONOSCOPY      CORONARY ANGIOPLASTY WITH STENT PLACEMENT  2009    LAD    CORONARY ANGIOPLASTY WITH STENT PLACEMENT      FEMUR FRACTURE SURGERY      LUNG SURGERY      RETINAL DETACHMENT SURGERY            Social History:   reports that he has never smoked. He has never used smokeless tobacco. He reports that he does not currently use alcohol. He reports that he  "does not use drugs.      Allergies:  Allergies   Allergen Reactions    Penicillins Hives, Rash and Shortness of breath       Outpatient Medications:  Current Outpatient Medications   Medication Instructions    amLODIPine (NORVASC) 10 mg, oral, Daily    aspirin 81 mg, oral, Daily    atorvastatin (LIPITOR) 40 mg, oral, Daily    B complex-vitamin C-folic acid (Josy-Jesus) 0.8 mg tablet 0.8 mg, oral, Daily    cholecalciferol (VITAMIN D-3) 25 mcg, oral, Daily    empagliflozin (JARDIANCE) 10 mg, oral, Daily    fenofibrate (Triglide) 160 mg tablet 1 tablet, oral, Daily    FreeStyle Lite Strips strip Daily    gabapentin (NEURONTIN) 300 mg, oral, 3 times daily    hydrALAZINE (APRESOLINE) 25 mg, oral, 3 times daily    icosapent ethyL (Vascepa) 1 gram capsule oral, 2 times daily with meals    insulin glargine-yfgn (SEMGLEE(INSULIN GLARG-YFGN)PEN) 70 Units, subcutaneous, Nightly    insulin lispro (HUMALOG KWIKPEN INSULIN) 80 Units, subcutaneous, Daily    levothyroxine (SYNTHROID, LEVOXYL) 25 mcg, oral, Daily    losartan (COZAAR) 50 mg, oral, Daily    metoprolol tartrate (LOPRESSOR) 50 mg, oral, 2 times daily    rosuvastatin (CRESTOR) 40 mg, oral, Nightly    SITagliptin phosphate (JANUVIA) 50 mg, oral, Daily    torsemide (DEMADEX) 20 mg, oral, Daily    Trulicity 4.5 mg, subcutaneous, Weekly       ROS     Physical Exam:  Vitals:    02/16/24 1157   BP: 146/75   BP Location: Left arm   Patient Position: Sitting   Pulse: 73   SpO2: 94%   Weight: 142 kg (312 lb 9 oz)   Height: 1.803 m (5' 11\")     Wt Readings from Last 5 Encounters:   02/16/24 142 kg (312 lb 9 oz)   02/12/24 144 kg (317 lb 3.2 oz)   01/03/24 143 kg (316 lb)   12/21/23 144 kg (317 lb)   10/04/23 139 kg (305 lb 12.8 oz)     Body mass index is 43.59 kg/m².     Well-developed male no acute distress distant heart sounds systolic murmur soft diastolic murmur.  Clear lungs.  Soft abdomen  Trace ankle edema  Last Labs:  CMP:  Recent Labs     02/09/24  0908 06/14/23  1119 " "05/31/22  0719 03/08/22  0800 12/28/21  0858    142 141 141 140   K 4.9 4.3 4.7 4.9 4.8    102 106 103 102   CO2 28 29 24 26 28   ANIONGAP 16 15 16 17 15   BUN 36* 37* 31* 51* 39*   CREATININE 2.61* 2.45* 2.65* 2.97* 2.62*   EGFR 27*  --   --   --   --    GLUCOSE 173* 108* 98 186* 173*     Recent Labs     02/09/24  0908 06/14/23  1119 05/31/22  0719 03/08/22  0800 12/28/21  0858 09/14/21  0737 09/15/20  1508 02/28/20  0746 12/20/19  0812 10/02/19  0926 08/30/18  0845   ALBUMIN 4.1  --  4.1 4.1 4.1 4.0   < > 4.2 4.0  --  3.9   ALKPHOS  --   --  32* 32*  --   --   --  37 31*  --  42   ALT  --  21 24 19  --   --   --  21 19   < > 19   AST  --   --  25 19  --   --   --  19 18  --  19   BILITOT  --   --  0.6 0.4  --   --   --  0.5 0.4  --  0.5    < > = values in this interval not displayed.     CBC:  Recent Labs     02/09/24  0908 05/31/22  0719 03/08/22  0800 12/28/21  0858 06/15/21  1512   WBC 5.4 6.5 6.8 6.6 7.0   HGB 13.0* 13.0* 13.2* 13.4* 13.0*   HCT 41.4 40.7* 41.5 43.2 40.3*    263 351 264 259   MCV 93 95 94 95 92     COAG: No results for input(s): \"INR\", \"DDIMERVTE\" in the last 50557 hours.  HEME/ENDO:  Recent Labs     02/09/24  0908 12/21/23  1335 06/14/23  1119 05/31/22  0719 03/08/22  0800 12/28/21  0858 06/15/21  1512 09/24/20  0731   FERRITIN 53  --   --   --   --  79 98 72   IRONSAT 20*  --   --   --   --  20* 30 22*   TSH  --   --  1.84 2.15 1.94 2.73  --   --    HGBA1C  --  6.7* 6.5* 6.4* 9.0* 9.4* 11.4  --       CARDIAC: No results for input(s): \"LDH\", \"CKMB\", \"TROPHS\", \"BNP\" in the last 88276 hours.    No lab exists for component: \"CK\", \"CKMBP\"  Recent Labs     05/31/22  0719 03/08/22  0800 02/28/20  0746   CHOL 137 150 230*   LDLF 46 61 -   HDL 43.1 46.6 48.1   TRIG 238* 213* 494*       Last Cardiology Tests:  ECG:      Echo:  Echo Results:  No results found for this or any previous visit from the past 3650 days.       Cath:      Stress Test:  Stress Results:  No results found for " this or any previous visit from the past 365 days.         Cardiac Imaging:  Colonoscopy Screening  Table formatting from the original result was not included.  Impression  4 subcentimeter polyps were removed with cold snare  Diverticulosis of mild severity in the sigmoid colon  Medium hemorrhoids    Findings  One 6 mm sessile polyp in the ascending colon; performed cold snare with   complete en bloc removal and retrieved specimen. The pathology specimen   was placed into Jar 1.  Two sessile polyps measuring from 5 mm up to 7 mm in the descending colon;   performed cold snare with complete en bloc removal and retrieved specimen.   The pathology specimen was placed into Jar 2.  One 6 mm sessile polyp in the sigmoid colon; performed cold snare with   complete en bloc removal and retrieved specimen. The pathology specimen   was placed into Jar 3.  Few small diverticula of mild severity in the sigmoid colon  External and internal medium hemorrhoids observed during retroflexion  The colon was otherwise normal on direct and retroflexion views.       Recommendation   Await pathology results    Follow up with PCP    Repeat colonoscopy in 3 years    Given the polyps and extensive history of adenomatous colon polyps.       Indication  Personal history of colonic polyps    Staff  Staff Role   Edinson Bass MD Proceduralist     Medications  See Anesthesia Record.     Preprocedure  A history and physical has been performed, and patient medication   allergies have been reviewed. The patient's tolerance of previous   anesthesia has been reviewed. The risks and benefits of the procedure and   the sedation options and risks were discussed with the patient. All   questions were answered and informed consent obtained.    Details of the Procedure  The patient underwent monitored anesthesia care, which was administered by   an anesthesia professional. The patient's blood pressure, ECG, ETCO2,   heart rate, level of consciousness,  oxygen and respirations were monitored   throughout the procedure. A digital rectal exam was performed. A perianal   exam was performed. The scope was introduced through the anus and advanced   to the cecum. Retroflexion was performed in the rectum. The quality of   bowel preparation was evaluated using the [place] Bowel Preparation Scale   with scores of: right colon = 2, transverse colon = 3, left colon = 2. The   total BBPS score was 7. Bowel prep was adequate. The patient's estimated   blood loss was minimal (<5 mL). The procedure was not difficult. The   patient tolerated the procedure well. There were no apparent adverse   events.     Events  Procedure Events   Event Event Time   ENDO SCOPE IN TIME 1/15/2024  1:57 PM   ENDO CECUM REACHED 1/15/2024  2:00 PM   ENDO SCOPE OUT TIME 1/15/2024  2:19 PM     Specimens  ID Type Source Tests Collected by Time   1 : cold snare Tissue COLON - ASCENDING POLYP SURGICAL PATHOLOGY EXAM Edinson Bass MD 1/15/2024 1414   2 : cold snare X 2 Tissue COLON - DESCENDING POLYP SURGICAL PATHOLOGY EXAM   Edinson Bass MD 1/15/2024 1411   3 : cold snare Tissue COLON - SIGMOID POLYP SURGICAL PATHOLOGY EXAM Edinson Bass MD 1/15/2024 1415     Procedure Location  [facility]  [address] [address] [phone]    Referring Provider  Neeru Wilkerson, Aprn-cnp  [address] [place] [address]    Procedure Provider  Edinson Bass MD        Assessment/Plan     Moderate AS, mild AI, clinically asymptomatic  ASCVD prior LCx and LAD stent.  Mixed hyperlipidemia,  Stage III obesity, stage IV CKD, HTN.  Recommend follow-up echo 1 year, office follow-up 6 months, lipid profile.  Orthostatic lightheadedness but blood pressure is relatively high did not want to make any med adjustments at this time.  Reviewed signs and symptoms of symptomatic aortic valve disease and the need to contact if symptoms such as these  develop      Orders:  No orders of the defined types were placed in this encounter.     Followup Appts:  Future Appointments   Date Time Provider Department Center   3/21/2024 10:00 AM Supa Zuñiga MD [id number] [facility]   5/13/2024  4:00 PM Antony Rodríguez MD [id number] [facility]           ____________________________________________________________  Woody Ryan MD    Senior Attending Physician  [facility]  [facility]

## 2024-03-08 DIAGNOSIS — E11.9 TYPE 2 DIABETES MELLITUS WITHOUT COMPLICATION, WITH LONG-TERM CURRENT USE OF INSULIN (MULTI): ICD-10-CM

## 2024-03-08 DIAGNOSIS — Z79.4 TYPE 2 DIABETES MELLITUS WITHOUT COMPLICATION, WITH LONG-TERM CURRENT USE OF INSULIN (MULTI): ICD-10-CM

## 2024-03-08 RX ORDER — DULAGLUTIDE 4.5 MG/.5ML
4.5 INJECTION, SOLUTION SUBCUTANEOUS
Qty: 2 ML | Refills: 3 | Status: SHIPPED | OUTPATIENT
Start: 2024-03-08 | End: 2024-03-21 | Stop reason: SDUPTHER

## 2024-03-21 ENCOUNTER — OFFICE VISIT (OUTPATIENT)
Dept: ENDOCRINOLOGY | Facility: CLINIC | Age: 64
End: 2024-03-21
Payer: COMMERCIAL

## 2024-03-21 VITALS — DIASTOLIC BLOOD PRESSURE: 78 MMHG | BODY MASS INDEX: 44.35 KG/M2 | WEIGHT: 315 LBS | SYSTOLIC BLOOD PRESSURE: 136 MMHG

## 2024-03-21 DIAGNOSIS — E78.5 HYPERLIPIDEMIA, UNSPECIFIED HYPERLIPIDEMIA TYPE: ICD-10-CM

## 2024-03-21 DIAGNOSIS — Z79.4 TYPE 2 DIABETES MELLITUS WITHOUT COMPLICATION, WITH LONG-TERM CURRENT USE OF INSULIN (MULTI): Primary | ICD-10-CM

## 2024-03-21 DIAGNOSIS — E11.9 TYPE 2 DIABETES MELLITUS WITHOUT COMPLICATION, WITH LONG-TERM CURRENT USE OF INSULIN (MULTI): Primary | ICD-10-CM

## 2024-03-21 DIAGNOSIS — I10 PRIMARY HYPERTENSION: ICD-10-CM

## 2024-03-21 LAB — POC HEMOGLOBIN A1C: 6.5 % (ref 4.2–6.5)

## 2024-03-21 PROCEDURE — 4010F ACE/ARB THERAPY RXD/TAKEN: CPT | Performed by: INTERNAL MEDICINE

## 2024-03-21 PROCEDURE — 3075F SYST BP GE 130 - 139MM HG: CPT | Performed by: INTERNAL MEDICINE

## 2024-03-21 PROCEDURE — 3062F POS MACROALBUMINURIA REV: CPT | Performed by: INTERNAL MEDICINE

## 2024-03-21 PROCEDURE — 1036F TOBACCO NON-USER: CPT | Performed by: INTERNAL MEDICINE

## 2024-03-21 PROCEDURE — 99214 OFFICE O/P EST MOD 30 MIN: CPT | Performed by: INTERNAL MEDICINE

## 2024-03-21 PROCEDURE — 83036 HEMOGLOBIN GLYCOSYLATED A1C: CPT | Performed by: INTERNAL MEDICINE

## 2024-03-21 PROCEDURE — 3078F DIAST BP <80 MM HG: CPT | Performed by: INTERNAL MEDICINE

## 2024-03-21 RX ORDER — DULAGLUTIDE 4.5 MG/.5ML
4.5 INJECTION, SOLUTION SUBCUTANEOUS
Qty: 2 ML | Refills: 3 | Status: SHIPPED | OUTPATIENT
Start: 2024-03-21 | End: 2024-03-27 | Stop reason: SDUPTHER

## 2024-03-21 NOTE — PROGRESS NOTES
Patient ID: Nicolas Shultz is a 63 y.o. male who presents for Follow-up.  HPI  The patient comes in for follow up.    Type 2 diabetes hypertension hyperlipidemia renal insufficiency thyroid nodules.    He continues on Humalog 20 to 30 units at breakfast and lunch 40-50 at dinner Semglee 40 units and Trulicity 4.5 mg Jardiance 10 mg through his nephrologist.    He continues using the freestyle sha 2.    His 90-day average 146 time in target 75%.    He had a carotid ultrasound which revealed a thyroid nodule.    In April 2022 he had 3 TI-RADS 1 nodules on the right and 1 TI-RADS 1 nodule on the left with a TI-RADS three 1.7 x 1.5 x 1.4 cm nodule in the isthmus.    In June 2023 we repeated an ultrasound which was stable and plan to repeat ultrasounds June 2025 and 2027.    He notes no change within his thyroid.    He has had no severe lows but did have a significant low in December when he was helping a friend move.    Physically he has no other complaints.    ROS  Comprehensive review of systems is negative.    Objective   Physical Exam  Visit Vitals  /78      Vitals:    03/21/24 1006   Weight: 144 kg (318 lb)      Body mass index is 44.35 kg/m².      Weight 318 up 1 pound    ENT normal. No adenopathy  Fundi normal  Thyroid palpable and normal. No nodules  Chest clear to auscultation  Heart sounds are normal  Abdomen nontender. Bowel sounds normal. No organomegaly  Feet not examined  Current Outpatient Medications   Medication Sig Dispense Refill    amLODIPine (Norvasc) 10 mg tablet Take 1 tablet (10 mg) by mouth once daily. 90 tablet 0    aspirin 81 mg EC tablet Take 1 tablet (81 mg) by mouth once daily.      atorvastatin (Lipitor) 40 mg tablet Take 1 tablet (40 mg) by mouth once daily.      B complex-vitamin C-folic acid (Josy-Jesus) 0.8 mg tablet Take 1 tablet by mouth once daily.      cholecalciferol (Vitamin D-3) 25 MCG (1000 UT) capsule Take 1 capsule (25 mcg) by mouth once daily.      dulaglutide  (Trulicity) 4.5 mg/0.5 mL pen injector Inject 4.5 mg under the skin 1 (one) time per week. 2 mL 3    empagliflozin (Jardiance) 10 mg Take 1 tablet (10 mg) by mouth once daily. 90 tablet 3    fenofibrate (Triglide) 160 mg tablet Take 1 tablet (160 mg) by mouth once daily.      FreeStyle Lite Strips strip once daily.      gabapentin (Neurontin) 300 mg capsule Take 1 capsule (300 mg) by mouth 3 times a day.      hydrALAZINE (Apresoline) 25 mg tablet Take 1 tablet (25 mg) by mouth 3 times a day.      icosapent ethyL (Vascepa) 1 gram capsule Take by mouth 2 times a day with meals.      insulin glargine-yfgn (Semglee,insulin glarg-yfgn,Pen) 100 unit/mL (3 mL) Pen Inject 70 Units under the skin once daily at bedtime. 90 mL 3    insulin lispro (HumaLOG KwikPen Insulin) 100 unit/mL injection Inject 80 Units under the skin once daily. 75 mL 3    levothyroxine (Synthroid, Levoxyl) 25 mcg tablet Take 1 tablet (25 mcg) by mouth once daily.      losartan (Cozaar) 50 mg tablet Take 1 tablet (50 mg) by mouth once daily. 90 tablet 3    metoprolol tartrate (Lopressor) 50 mg tablet Take 1 tablet (50 mg) by mouth 2 times a day. 180 tablet 0    rosuvastatin (Crestor) 40 mg tablet Take 1 tablet (40 mg) by mouth once daily at bedtime.      SITagliptin phosphate (Januvia) 50 mg tablet Take 1 tablet (50 mg) by mouth once daily.      torsemide (Demadex) 20 mg tablet Take 1 tablet (20 mg) by mouth once daily.       No current facility-administered medications for this visit.       Assessment/Plan     1.  Type 2 diabetes mellitus  2.  Renal insufficiency  3.  TI-RADS 3 nodule  4.  Hypertension  5.  Hyperlipidemia    Will check hemoglobin A1c by fingerstick today.    He will continue looking for patterns in his numbers.    We again discussed hypoglycemia.    Will plan to repeat an ultrasound in June 2025 and 2027.    Will consider switching him to the freestyle sha 3.    He will follow-up with me in 3 months sooner as needed.

## 2024-03-28 DIAGNOSIS — E11.9 TYPE 2 DIABETES MELLITUS WITHOUT COMPLICATION, WITH LONG-TERM CURRENT USE OF INSULIN (MULTI): ICD-10-CM

## 2024-03-28 DIAGNOSIS — Z79.4 TYPE 2 DIABETES MELLITUS WITHOUT COMPLICATION, WITH LONG-TERM CURRENT USE OF INSULIN (MULTI): ICD-10-CM

## 2024-03-28 RX ORDER — DULAGLUTIDE 4.5 MG/.5ML
4.5 INJECTION, SOLUTION SUBCUTANEOUS
Qty: 12 PEN | Refills: 3 | Status: SHIPPED | OUTPATIENT
Start: 2024-03-28 | End: 2025-03-28

## 2024-05-09 ENCOUNTER — LAB (OUTPATIENT)
Dept: LAB | Facility: LAB | Age: 64
End: 2024-05-09
Payer: COMMERCIAL

## 2024-05-09 DIAGNOSIS — R60.0 LOCALIZED EDEMA: ICD-10-CM

## 2024-05-09 DIAGNOSIS — N18.4 CHRONIC RENAL IMPAIRMENT, STAGE 4 (SEVERE) (MULTI): ICD-10-CM

## 2024-05-09 DIAGNOSIS — N06.0 ISOLATED PROTEINURIA WITH MINOR GLOMERULAR ABNORMALITY: ICD-10-CM

## 2024-05-09 DIAGNOSIS — I70.0 AORTIC ATHEROSCLEROSIS (CMS-HCC): ICD-10-CM

## 2024-05-09 DIAGNOSIS — I10 PRIMARY HYPERTENSION: ICD-10-CM

## 2024-05-09 DIAGNOSIS — D50.8 OTHER IRON DEFICIENCY ANEMIA: ICD-10-CM

## 2024-05-09 LAB
25(OH)D3 SERPL-MCNC: 25 NG/ML (ref 30–100)
ALBUMIN SERPL BCP-MCNC: 4.1 G/DL (ref 3.4–5)
ANION GAP SERPL CALC-SCNC: 16 MMOL/L (ref 10–20)
BUN SERPL-MCNC: 45 MG/DL (ref 6–23)
CALCIUM SERPL-MCNC: 9 MG/DL (ref 8.6–10.6)
CHLORIDE SERPL-SCNC: 105 MMOL/L (ref 98–107)
CHOLEST SERPL-MCNC: 169 MG/DL (ref 0–199)
CHOLESTEROL/HDL RATIO: 3.4
CO2 SERPL-SCNC: 25 MMOL/L (ref 21–32)
CREAT SERPL-MCNC: 2.63 MG/DL (ref 0.5–1.3)
CREAT UR-MCNC: 62.2 MG/DL (ref 20–370)
EGFRCR SERPLBLD CKD-EPI 2021: 27 ML/MIN/1.73M*2
ERYTHROCYTE [DISTWIDTH] IN BLOOD BY AUTOMATED COUNT: 13.2 % (ref 11.5–14.5)
FERRITIN SERPL-MCNC: 62 NG/ML (ref 20–300)
GLUCOSE SERPL-MCNC: 151 MG/DL (ref 74–99)
HCT VFR BLD AUTO: 41.1 % (ref 41–52)
HDLC SERPL-MCNC: 49.1 MG/DL
HGB BLD-MCNC: 13 G/DL (ref 13.5–17.5)
IRON SATN MFR SERPL: 23 % (ref 25–45)
IRON SERPL-MCNC: 102 UG/DL (ref 35–150)
LDLC SERPL CALC-MCNC: 61 MG/DL
MCH RBC QN AUTO: 29.3 PG (ref 26–34)
MCHC RBC AUTO-ENTMCNC: 31.6 G/DL (ref 32–36)
MCV RBC AUTO: 93 FL (ref 80–100)
MICROALBUMIN UR-MCNC: 242.8 MG/L
MICROALBUMIN/CREAT UR: 390.4 UG/MG CREAT
NON HDL CHOLESTEROL: 120 MG/DL (ref 0–149)
NRBC BLD-RTO: 0 /100 WBCS (ref 0–0)
PHOSPHATE SERPL-MCNC: 3.9 MG/DL (ref 2.5–4.9)
PLATELET # BLD AUTO: 246 X10*3/UL (ref 150–450)
POTASSIUM SERPL-SCNC: 4.4 MMOL/L (ref 3.5–5.3)
PTH-INTACT SERPL-MCNC: 85.1 PG/ML (ref 18.5–88)
RBC # BLD AUTO: 4.43 X10*6/UL (ref 4.5–5.9)
SODIUM SERPL-SCNC: 142 MMOL/L (ref 136–145)
TIBC SERPL-MCNC: 453 UG/DL (ref 240–445)
TRIGL SERPL-MCNC: 294 MG/DL (ref 0–149)
UIBC SERPL-MCNC: 351 UG/DL (ref 110–370)
VLDL: 59 MG/DL (ref 0–40)
WBC # BLD AUTO: 5.8 X10*3/UL (ref 4.4–11.3)

## 2024-05-09 PROCEDURE — 83970 ASSAY OF PARATHORMONE: CPT

## 2024-05-09 PROCEDURE — 80061 LIPID PANEL: CPT

## 2024-05-09 PROCEDURE — 36415 COLL VENOUS BLD VENIPUNCTURE: CPT

## 2024-05-09 PROCEDURE — 82306 VITAMIN D 25 HYDROXY: CPT

## 2024-05-09 PROCEDURE — 85027 COMPLETE CBC AUTOMATED: CPT

## 2024-05-09 PROCEDURE — 82728 ASSAY OF FERRITIN: CPT

## 2024-05-09 PROCEDURE — 83540 ASSAY OF IRON: CPT

## 2024-05-09 PROCEDURE — 83550 IRON BINDING TEST: CPT

## 2024-05-09 PROCEDURE — 82043 UR ALBUMIN QUANTITATIVE: CPT

## 2024-05-09 PROCEDURE — 82570 ASSAY OF URINE CREATININE: CPT

## 2024-05-09 PROCEDURE — 80069 RENAL FUNCTION PANEL: CPT

## 2024-05-13 ENCOUNTER — OFFICE VISIT (OUTPATIENT)
Dept: NEPHROLOGY | Facility: CLINIC | Age: 64
End: 2024-05-13
Payer: COMMERCIAL

## 2024-05-13 VITALS
SYSTOLIC BLOOD PRESSURE: 121 MMHG | BODY MASS INDEX: 42.66 KG/M2 | OXYGEN SATURATION: 92 % | DIASTOLIC BLOOD PRESSURE: 62 MMHG | TEMPERATURE: 98 F | RESPIRATION RATE: 16 BRPM | HEIGHT: 72 IN | HEART RATE: 79 BPM | WEIGHT: 315 LBS

## 2024-05-13 DIAGNOSIS — N06.0 ISOLATED PROTEINURIA WITH MINOR GLOMERULAR ABNORMALITY: ICD-10-CM

## 2024-05-13 DIAGNOSIS — N18.4 CHRONIC RENAL IMPAIRMENT, STAGE 4 (SEVERE) (MULTI): Primary | ICD-10-CM

## 2024-05-13 DIAGNOSIS — I10 PRIMARY HYPERTENSION: ICD-10-CM

## 2024-05-13 PROCEDURE — 3048F LDL-C <100 MG/DL: CPT | Performed by: INTERNAL MEDICINE

## 2024-05-13 PROCEDURE — 3078F DIAST BP <80 MM HG: CPT | Performed by: INTERNAL MEDICINE

## 2024-05-13 PROCEDURE — 99214 OFFICE O/P EST MOD 30 MIN: CPT | Performed by: INTERNAL MEDICINE

## 2024-05-13 PROCEDURE — 3060F POS MICROALBUMINURIA REV: CPT | Performed by: INTERNAL MEDICINE

## 2024-05-13 PROCEDURE — 4010F ACE/ARB THERAPY RXD/TAKEN: CPT | Performed by: INTERNAL MEDICINE

## 2024-05-13 PROCEDURE — 3074F SYST BP LT 130 MM HG: CPT | Performed by: INTERNAL MEDICINE

## 2024-05-13 NOTE — PATIENT INSTRUCTIONS
Dear MARYSOL   It was nice seeing you in the nephrology clinic today     Today we discussed the following:     # Chronic kidney disease (CKD) -stage 4 ~your baseline 25-30%-relatively stable current (GFR is 27 as of May 2024)  - CKD is due to hypertension and diabetes and remote Advil use     # Hypertension -in accepted today.  Currently you take torsemide 20 mg for leg swelling, hydralazine 25 mg 3 times daily, amlodipine 10 mg, metoprolol 50 mg twice daily.        # Diabetes-uncontrolled but improving and currently in target.  Continue Jardiance 25 mg    # Worsening protein leak in the urine-now improving down from 900 mg to 390 mg-good job     Plan  -Continue losartan and Jardiance for kidney protection  -Limit animal-based protein 3 times weekly instead of daily        - follow up in 6 months  with repeat blood work and urine analysis before visit        Please call our office if you have any question  Thank you for coming to see me today     Antony Rodríguez MD, MS, MICHELLE ZAMUDIO  Clinical  - [facility] [employer]  Nephrologist - [facility]

## 2024-05-13 NOTE — PROGRESS NOTES
Subjective       Nicolas Shultz is a 63 y.o. male who has past medical history of   uncontrolled DM with no known retinopathy, HTN, HLD, CAD s/p PCI, morbid obesity, LUCIANO not on CPAP, remote history of renal failure in 2008 due to NSAIDs, and retinal detachment who is coming today as 6 ms f/u for CKD    Last office visit February 2024.  Nicolas came alone today.  He feels good.  No kidney related complaints or concerns.  He is following instructions carefully.  He is doing everything possible to he will not end up on dialysis.  Blood pressure is accepted today.  Repeat blood work in May 2024 showed stable serum creatinine 2.6 and GFR 27.  No anemia.  Improved albuminuria significantly from 1 g down to 390 mg/g per spot test ACR.  Improved A1c from 9.4 down to 6.5.  He is currently on Jardiance and losartan.  He is not orthostatic hypotensive as per prior office visits.  Overall he is stable and may be getting better    Prior notes    Last office visit August 2023.  Nicolas came alone today.  No kidney related complaints or concerns.  He is not very sure about his medication list as we noticed losartan and lisinopril are both on the list.  He will confirm with a call back when he goes home.  He had blood work done recently that showed stable serum creatinine 2.6 and GFR 27 and within normal electrolytes.  No significant anemia hemoglobin 13.  Protein leak is worsening after it was improving and now up to 917 mg/g.  He is adherent to Jardiance and losartan.  He is adherent to low-salt diet.  He admits to consuming animal-based protein.  Blood pressure is accepted with positive orthostatic hypotension-again.  Currently on small dose water pill torsemide 20 mg.  He has chronic leg swelling with no significant hypervolemia on exam.  Overall we discussed GFR preservation conservative measures    Prior notes     Last office visit January 2023. We discussed strict diabetes control. In the interim, Mr. Shultz started CGM  with better control to A1c. He continues to feel dizzy when he stands up with orthostatic hypotension-he is planned to see cardiology soon. He had blood work done last month and results were discussed with him in details including stable serum creatinine 2.4 and GFR 29 in his baseline. Within normal electrolytes. Improved albuminuria 100 mg/g. Improved A1c 6.5. He is adherent to medications. Blood pressure is in target today with chronic orthostatic hypotension-symptomatic. He is currently on torsemide 20 mg daily     Per prior notes      Evaluated virtually today. Camera did not work in the visit. Phone  Last office visit September 2021. We started Jardiance in summer 2021. Repeat blood work in September 2021 showed slightly worsening serum creatinine and GFR was down to 21% from his baseline 25-30% which thought to be secondary to physiologic effect of SGLT2 inhibitor. In the interim, Nicolas continues to follow with endocrinology. He is working hard to get his diabetes under better control. Repeat blood work in December 2021 showed serum creatinine back to baseline 2.6, GFR 25 mils per minute per 1.73 mÂ². Protein creatinine ratio improved from 2.6 g--> 0.4 g.     Nicolas was evaluated virtually today. He feels fine in his baseline state of health. He is excited about stable GFR. All questions were answered. Results of blood work was discussed with him in details. He checks blood pressure at home and average reading 120/80     Per prior notes     Last office visit June 2021. In the interim, we started Jardiance for better diabetes control and improved renal outcome. He came alone today. He reports weight loss and feels lighter after starting SGL 2 inhibitors. Blood pressure is in target 120/80 when he checks at home. He denies lower urinary tract symptoms or foam or blood in urine. No NSAID use at home. He is worried about his kidney numbers. Repeat blood work showed slightly worsening serum creatinine and GFR  (expected after starting Jardiance). On the other side, proteinuria/albuminuria improved. No specific complaints today. He is hoping that things will remain to be stable in the future. He continues to work with his diabetes doctor but diabetes is fluctuating and A1c is not in target     Per prior notes  Last office visit December 2020. In the interim, he remains to work on better diabetes control. Increase his insulin. A1c dropped from 14-9. He is trying to drink more fluid and limit salt in diet. He checks blood pressure at home and average reading 120/80. Sometimes diastolic readings are in the 60s. He reports making good amount of urine with no lower urinary tract symptoms. No blood or foam in the urine. No leg swelling or shortness of breath. He remains to be on losartan in addition to the rest of his blood pressure medication. He is due for blood work. He still did not start his CPAP machine because he lost follow-up with the sleep lab team        Per prior notes  LOV - 9/2020, Scr 2.5, GFR 25%. He self stopped Lisnopril due to leg cramps. In the interim, he follows with sleep medicine team to get a new CPAP machine for LUCIANO. Repeat Blood work in 9/2020 showed improved SCr to 2.1, GFR 31. He reports infrequent BP checks at home ~ 130/90. Feels well. Admits bad eating habits due to the pandemic situation, DM is not well controlled and weight gain ~ 10 ibs in the last 3 months. He reports drinking plenty of fluid and makes good urine out put. No c/o today         Per prior notes     LOV - 6/3/2020 - We discussed life style changes and CKD in details. No changes in meds at this time. Most recent RFP in 2/2020 with Scr 2.5, GFR 25%. He did not get work up as planned prior to the visit today.   Today, he is coming alone. Reports stopping Lisinopril due to sever legs pain/cramp. Pain improved after holding Lisinopril. He checks BP at home ~ 120/80 off Lisnopril. Reports numbness in the feet and hands. He does not use  "CPAP as last time he used ot- 3 days later he developed retinal detachment and he thinks it is related. He is willing to re-evaluated for LUCIANO. Not taking Advil anymore. Reports making good UOP.            Per prior notes     Mr Shultz is aware of chronic kidney disease since 2008  He denies LUTs, blood in urine or foam   No more NSAIDs intake   He denies retinopathy   Check BP at home ~ 130-140/80-90s  No leg swelling   He only take Lisinopril 30 mgx1 daily         Social: non smoker, social ETOH  Fam Hx - irrelevant - no dialysis       Objective   /62 (BP Location: Right arm, Patient Position: Standing, BP Cuff Size: Large adult long)   Pulse 79   Temp 36.7 °C (98 °F)   Resp 16   Ht 1.816 m (5' 11.5\")   Wt 146 kg (322 lb)   SpO2 92%   BMI 44.28 kg/m²   Wt Readings from Last 3 Encounters:   05/13/24 146 kg (322 lb)   03/21/24 144 kg (318 lb)   02/16/24 142 kg (312 lb 9 oz)       Physical Exam    General appearance: no distress awake and alert on room air, euvolemic on exam, obese, obese  Eyes: non-icteric  HEENT: atrumatic head, PEERLA, moist mucosa  Skin: no apparent rash  Heart: NSR, S1, S2 normal, no murmur or gallop  Lungs: Symmetrical expansion,CTA bilat no wheezing/crackles  Abdomen: soft, nt/nd, obese  Extremities: Soft pitting edema bilateral  Neuro: No FND,asterixis, no focal deficits noticed        Review of Systems     Constitutional: no fever, no chills, no recent weight gain and no recent weight loss.   Eyes: no blurred vision and no diplopia.   ENT: no hearing loss, no earache, no sore throat, no swollen glands in the neck and no nasal discharge.   Cardiovascular: no chest pain, no palpitations and no lower extremity edema.   Respiratory: no shortness of breath, no chronic cough and no shortness of breath during exertion.   Gastrointestinal: no abdominal pain, no constipation, no heartburn, no vomiting, no bloody stools and no change in bowel movements.   Genitourinary: no dysuria and no " "hematuria.   Musculoskeletal: no arthralgias and no myalgias.   Skin: no rashes and no skin lesions.   Neurological: no headaches and no dizziness.   Psychiatric: no confusion, no depression and no anxiety.   Endocrine: no heat intolerance, no cold intolerance, appetite not increased, no thyroid disorder, no increased urinary frequency and no dry skin.   Hematologic/Lymphatic: does not bleed easily and does not bruise easily.   All other systems have been reviewed and are negative for complaint.         Data Review        Results from last 7 days   Lab Units 05/09/24  0758   WBC AUTO x10*3/uL 5.8   HEMOGLOBIN g/dL 13.0*   HEMATOCRIT % 41.1   PLATELETS AUTO x10*3/uL 246            No results found for: \"URICACID\"        Lab Results   Component Value Date    HGBA1C 6.5 03/21/2024           Results from last 7 days   Lab Units 05/09/24  0758   SODIUM mmol/L 142   POTASSIUM mmol/L 4.4   CHLORIDE mmol/L 105   CO2 mmol/L 25   BUN mg/dL 45*   GLUCOSE mg/dL 151*   CALCIUM mg/dL 9.0   ANION GAP mmol/L 16   EGFR mL/min/1.73m*2 27*           Albumin/Creatine Ratio   Date Value Ref Range Status   05/09/2024 390.4 (H) <30.0 ug/mg Creat Final   02/09/2024 917.6 (H) <30.0 ug/mg Creat Final   06/14/2023 107.3 (H) 0.0 - 30.0 ug/mg crt Final            RFP  Recent Labs     05/09/24  0758 02/09/24  0908 06/14/23  1119 05/31/22  0719 03/08/22  0800 12/28/21  0858 09/14/21  0737 06/15/21  1512 12/18/20  0738 09/15/20  1508    142 142 141 141 140 136 133* 137 135*   K 4.4 4.9 4.3 4.7 4.9 4.8 4.8 5.0 4.5 4.6    103 102 106 103 102 100 97* 99 97*   CO2 25 28 29 24 26 28 25 28 28 28   BUN 45* 36* 37* 31* 51* 39* 45* 36* 32* 24*   CREATININE 2.63* 2.61* 2.45* 2.65* 2.97* 2.62* 3.07* 2.54* 2.18* 2.16*   GLUCOSE 151* 173* 108* 98 186* 173* 261* 290* 279* 331*   CALCIUM 9.0 9.8 9.9 9.4 9.6 10.3 9.4 9.3 9.3 9.7   PHOS 3.9 4.0  --   --   --  4.9 4.5 3.6 3.9 3.5   EGFR 27* 27*  --   --   --   --   --   --   --   --    ANIONGAP 16 16 15 " 16 17 15 16 13 15 15        Urineanalysis  Recent Labs     05/31/22  0719 12/28/21  0858 06/15/21  1512 12/18/20  0738 09/15/20  1610 02/28/20  0746   COLORU STRAW  --  YELLOW  --   --  YELLOW   APPEARANCEU CLEAR  --  CLEAR  --   --  CLEAR   SPECGRAVU 1.008  --  1.009  --   --  1.013   NELSON 5.0  --  6.0  --   --  6.0   PROTUR NEGATIVE 22 76*  100(2+)* 119* 29* 30 (1+)*   GLUCOSEU >=500 (3+)*  --  >=500(3+)*  --   --  >=500 (3+)*   BLOODU NEGATIVE  --  NEGATIVE  --   --  NEGATIVE   KETONESU NEGATIVE  --  NEGATIVE  --   --  NEGATIVE   BILIRUBINU NEGATIVE  --  NEGATIVE  --   --  NEGATIVE   NITRITEU NEGATIVE  --  NEGATIVE  --   --  NEGATIVE   LEUKOCYTESU NEGATIVE  --  NEGATIVE  --   --  NEGATIVE       Urine Electrolytes  Recent Labs     05/09/24  0758 02/09/24  0908 06/14/23  1119 05/31/22  0719 12/28/21  0858 09/14/21  0737 06/15/21  1512 12/18/20  0738 09/15/20  1610 02/28/20  0746 10/02/19  0927   CREATU 62.2 53.5 64.4  --  51.5  51.5 52.4 54.4  54.4 44.4 27.5  --  39.0   PROTUR  --   --   --  NEGATIVE 22  --  76*  100(2+)* 119* 29* 30 (1+)*  --    UTPCR  --   --   --   --  0.43*  --  1.40* 2.68* 1.05*  --   --    ALBUMINUR 242.8 490.9  --   --   --   --   --   --   --   --   --    MICROALBCREA 390.4* 917.6* 107.3*  --  198.4* 248.1* 877.0*  --   --   --  455.9*        Urine Micro  Recent Labs     06/15/21  1512 02/28/20  0746   WBCU <1 1   RBCU <1 <1   HYALCASTU  --  OCC*   SQUAMEPIU <1  --    MUCUSU FEW 1+        Iron  Recent Labs     05/09/24  0758 02/09/24  0908 12/28/21  0858 06/15/21  1512 09/24/20  0731   IRON 102 88 90 116 85   TIBC 453* 439 456* 392 382   IRONSAT 23* 20* 20* 30 22*   FERRITIN 62 53 79 98 72          Current Outpatient Medications on File Prior to Visit   Medication Sig Dispense Refill    atorvastatin (Lipitor) 40 mg tablet Take 1 tablet (40 mg) by mouth once daily.      B complex-vitamin C-folic acid (Josy-Jesus) 0.8 mg tablet Take 1 tablet by mouth once daily.      cholecalciferol  (Vitamin D-3) 25 MCG (1000 UT) capsule Take 1 capsule (25 mcg) by mouth once daily.      dulaglutide (Trulicity) 4.5 mg/0.5 mL pen injector Inject 4.5 mg under the skin 1 (one) time per week. 12 Pen 3    empagliflozin (Jardiance) 10 mg Take 1 tablet (10 mg) by mouth once daily. 90 tablet 3    fenofibrate (Triglide) 160 mg tablet Take 1 tablet (160 mg) by mouth once daily.      FreeStyle Lite Strips strip once daily.      gabapentin (Neurontin) 300 mg capsule Take 1 capsule (300 mg) by mouth 3 times a day.      hydrALAZINE (Apresoline) 25 mg tablet Take 1 tablet (25 mg) by mouth 3 times a day.      icosapent ethyL (Vascepa) 1 gram capsule Take by mouth 2 times a day with meals.      insulin glargine-yfgn (Semglee,insulin glarg-yfgn,Pen) 100 unit/mL (3 mL) Pen Inject 70 Units under the skin once daily at bedtime. 90 mL 3    insulin lispro (HumaLOG KwikPen Insulin) 100 unit/mL injection Inject 80 Units under the skin once daily. 75 mL 3    levothyroxine (Synthroid, Levoxyl) 25 mcg tablet Take 1 tablet (25 mcg) by mouth once daily.      losartan (Cozaar) 50 mg tablet Take 1 tablet (50 mg) by mouth once daily. 90 tablet 3    rosuvastatin (Crestor) 40 mg tablet Take 1 tablet (40 mg) by mouth once daily at bedtime.      SITagliptin phosphate (Januvia) 50 mg tablet Take 1 tablet (50 mg) by mouth once daily.      torsemide (Demadex) 20 mg tablet Take 1 tablet (20 mg) by mouth once daily.      amLODIPine (Norvasc) 10 mg tablet Take 1 tablet (10 mg) by mouth once daily. 90 tablet 0    aspirin 81 mg EC tablet Take 1 tablet (81 mg) by mouth once daily.      metoprolol tartrate (Lopressor) 50 mg tablet Take 1 tablet (50 mg) by mouth 2 times a day. 180 tablet 0     No current facility-administered medications on file prior to visit.           Assessment and Plan       Nicolas Shultz  is a 63 y.o. male who has past medical history of  uncontrolled DM with no known retinopathy, HTN, HLD, CAD s/p PCI, morbid obesity, LUCIANO not on  CPAP, remote history of renal failure in 2008 due to NSAIDs, and retinal detachment who is coming today as 3 ms f/u for CKD     Impression     # Chronic kidney disease - G4- A3 - likely DM/HTN/ASCVD and remote NSAIDs/ and prior unresolved CHEMA  - Baseline SCr 2-2.5 , GFR 25-30. Serum creatinine is 2.6, GFR 27 mils per minute per 1.73 mÂ²-at baseline  - [facility] urine analysis showed +1 protien in urine, no blood  - [place] dipstick in office earlier showed +G, +Prtn. PCR spot test in 9/2020 1 g/g with repeat in December 2020 up to 2.6 g/g (worsening proteinuria). He started losartan and Jardiance about 12 months ago. Repeat spot test albumin creatinine ratio is down to 390 mg/g based on the most recent spot test ACR.  Discussed low-salt diet and low animal-based protein diet.  Will continue RAAS inhibitors and SGL 2 inhibitors  -Kidney ultrasound done in 2021 showed normal size kidneys bilateral about 11 cm with no significant blockage or stones  - Lyes : Accepted K, Na and no acidosis     We will continue to follow conservative measures. Today discussed with Mr. Shultz that controlling diabetes is essential to keep GFR stable. He got CGM inserted with better controlled his diabetes. I also encouraged him to address LUCIANO and start CPAP which might help with the CKD progression        # BP -well controlled in target today was negative orthostatic hypotension  - Current meds: Hydralazine 25 mg x3, Amlodipine 10 mg,, Metop and Torsemide 20 mg daily and losartan 50 mg.             #Anemia Hb in target 13     #BMD   -Within normal calcium, phosphorus. And PTH. Vitamin D is low-continue vitamin D supplements     # [facility]  - On statins  -On RAAS inhibitors and SGL 2 inhibitors. We will consider finerenone with follow-up        #Diabetes - HA1C 9.4 improved from prior and now down to 6.5  -On SGL 2 inhibitors  -Follows with endocrine. Needs strict control  -We will consider finerenone with follow-up        #LUCIANO counseled earlier  regarding the significance of initiating CPAP machine     #Others  - No NSAIDs, no contrast as possible  - Ensure well hydration  - Limit salt in diet  - No smoking        Follow-up in 6 months.. Course seems to be stable at this time. Will need strict DM control and close nephrology follow up.         Antony Rodríguez MD, MS, MICHELLE ZAMUDIO  Clinical  - [facility] [employer]  Nephrologist - [facility]

## 2024-05-16 DIAGNOSIS — Z79.4 TYPE 2 DIABETES MELLITUS WITHOUT COMPLICATION, WITH LONG-TERM CURRENT USE OF INSULIN (MULTI): Primary | ICD-10-CM

## 2024-05-16 DIAGNOSIS — E11.9 TYPE 2 DIABETES MELLITUS WITHOUT COMPLICATION, WITH LONG-TERM CURRENT USE OF INSULIN (MULTI): Primary | ICD-10-CM

## 2024-05-16 RX ORDER — FLASH GLUCOSE SENSOR
KIT MISCELLANEOUS
Qty: 6 EACH | Refills: 0 | Status: SHIPPED | OUTPATIENT
Start: 2024-05-16 | End: 2024-05-22 | Stop reason: SDUPTHER

## 2024-05-22 DIAGNOSIS — E11.9 TYPE 2 DIABETES MELLITUS WITHOUT COMPLICATION, WITH LONG-TERM CURRENT USE OF INSULIN (MULTI): ICD-10-CM

## 2024-05-22 DIAGNOSIS — Z79.4 TYPE 2 DIABETES MELLITUS WITHOUT COMPLICATION, WITH LONG-TERM CURRENT USE OF INSULIN (MULTI): ICD-10-CM

## 2024-05-22 RX ORDER — FLASH GLUCOSE SENSOR
KIT MISCELLANEOUS
Qty: 6 EACH | Refills: 0 | Status: SHIPPED | OUTPATIENT
Start: 2024-05-22

## 2024-06-24 ENCOUNTER — APPOINTMENT (OUTPATIENT)
Dept: ENDOCRINOLOGY | Facility: CLINIC | Age: 64
End: 2024-06-24
Payer: COMMERCIAL

## 2024-06-24 VITALS — BODY MASS INDEX: 44.15 KG/M2 | DIASTOLIC BLOOD PRESSURE: 78 MMHG | WEIGHT: 315 LBS | SYSTOLIC BLOOD PRESSURE: 148 MMHG

## 2024-06-24 DIAGNOSIS — I10 PRIMARY HYPERTENSION: ICD-10-CM

## 2024-06-24 DIAGNOSIS — E11.9 TYPE 2 DIABETES MELLITUS WITHOUT COMPLICATION, WITH LONG-TERM CURRENT USE OF INSULIN (MULTI): Primary | ICD-10-CM

## 2024-06-24 DIAGNOSIS — E04.1 THYROID NODULE: ICD-10-CM

## 2024-06-24 DIAGNOSIS — E10.9 TYPE 1 DIABETES MELLITUS WITHOUT COMPLICATION (MULTI): ICD-10-CM

## 2024-06-24 DIAGNOSIS — E11.9 TYPE 2 DIABETES MELLITUS WITHOUT COMPLICATION, WITH LONG-TERM CURRENT USE OF INSULIN (MULTI): ICD-10-CM

## 2024-06-24 DIAGNOSIS — E78.5 HYPERLIPIDEMIA, UNSPECIFIED HYPERLIPIDEMIA TYPE: ICD-10-CM

## 2024-06-24 DIAGNOSIS — Z79.4 TYPE 2 DIABETES MELLITUS WITHOUT COMPLICATION, WITH LONG-TERM CURRENT USE OF INSULIN (MULTI): Primary | ICD-10-CM

## 2024-06-24 DIAGNOSIS — Z79.4 TYPE 2 DIABETES MELLITUS WITHOUT COMPLICATION, WITH LONG-TERM CURRENT USE OF INSULIN (MULTI): ICD-10-CM

## 2024-06-24 LAB — POC HEMOGLOBIN A1C: 6.8 % (ref 4.2–6.5)

## 2024-06-24 PROCEDURE — 99214 OFFICE O/P EST MOD 30 MIN: CPT | Performed by: INTERNAL MEDICINE

## 2024-06-24 PROCEDURE — 3048F LDL-C <100 MG/DL: CPT | Performed by: INTERNAL MEDICINE

## 2024-06-24 PROCEDURE — 4010F ACE/ARB THERAPY RXD/TAKEN: CPT | Performed by: INTERNAL MEDICINE

## 2024-06-24 PROCEDURE — 3060F POS MICROALBUMINURIA REV: CPT | Performed by: INTERNAL MEDICINE

## 2024-06-24 PROCEDURE — 83036 HEMOGLOBIN GLYCOSYLATED A1C: CPT | Performed by: INTERNAL MEDICINE

## 2024-06-24 PROCEDURE — 3077F SYST BP >= 140 MM HG: CPT | Performed by: INTERNAL MEDICINE

## 2024-06-24 PROCEDURE — 3078F DIAST BP <80 MM HG: CPT | Performed by: INTERNAL MEDICINE

## 2024-06-24 RX ORDER — BLOOD-GLUCOSE SENSOR
1 EACH MISCELLANEOUS CONTINUOUS
Qty: 2 EACH | Refills: 3 | Status: SHIPPED | OUTPATIENT
Start: 2024-06-24 | End: 2024-07-24

## 2024-06-24 RX ORDER — BLOOD-GLUCOSE,RECEIVER,CONT
EACH MISCELLANEOUS
Qty: 1 EACH | Refills: 0 | Status: SHIPPED | OUTPATIENT
Start: 2024-06-24

## 2024-06-24 RX ORDER — INSULIN LISPRO 100 [IU]/ML
80 INJECTION, SOLUTION INTRAVENOUS; SUBCUTANEOUS DAILY
Qty: 75 ML | Refills: 3 | Status: SHIPPED | OUTPATIENT
Start: 2024-06-24

## 2024-06-24 RX ORDER — INSULIN GLARGINE-YFGN 100 [IU]/ML
70 INJECTION, SOLUTION SUBCUTANEOUS NIGHTLY
Qty: 90 ML | Refills: 3 | Status: SHIPPED | OUTPATIENT
Start: 2024-06-24

## 2024-06-24 RX ORDER — DULAGLUTIDE 4.5 MG/.5ML
4.5 INJECTION, SOLUTION SUBCUTANEOUS
Qty: 12 PEN | Refills: 3 | Status: SHIPPED | OUTPATIENT
Start: 2024-06-30 | End: 2025-06-30

## 2024-06-24 NOTE — PROGRESS NOTES
Patient ID: Nicolas Shultz is a 63 y.o. male who presents for Follow-up.  HPI  The patient comes in for follow up.    Type 2 diabetes hypertension hyperlipidemia renal insufficiency thyroid nodules.    He continues on Humalog 20 to 30 units at breakfast and lunch 40-50 at dinner Semglee 40 units and Trulicity 4.5 mg Jardiance 10 mg through his nephrologist.    He continues using the freestyle sha 2.    His 90-day average 156 time in target 70%.  His numbers have gone up after breakfast.    He had a carotid ultrasound which revealed a thyroid nodule.    In April 2022 he had 3 TI-RADS 1 nodules on the right and 1 TI-RADS 1 nodule on the left with a TI-RADS three 1.7 x 1.5 x 1.4 cm nodule in the isthmus.    In June 2023 we repeated an ultrasound which was stable and plan to repeat ultrasounds June 2025 and 2027.    He notes no change within his thyroid.    He has had no severe lows but did have a significant low in December when he was helping a friend move.    Physically he has no other complaints.    ROS  Comprehensive review of systems is negative.    Objective   Physical Exam  Visit Vitals  /78      Vitals:    06/24/24 1030   Weight: 146 kg (321 lb)      Body mass index is 44.15 kg/m².      Weight 321 up 3 pounds    ENT normal. No adenopathy  Fundi poorly visualized  Thyroid palpable and normal. No nodules  Chest clear to auscultation  Heart sounds are normal  Abdomen nontender. Bowel sounds normal. No organomegaly  Feet not examined  Current Outpatient Medications   Medication Sig Dispense Refill    amLODIPine (Norvasc) 10 mg tablet Take 1 tablet (10 mg) by mouth once daily. 90 tablet 0    aspirin 81 mg EC tablet Take 1 tablet (81 mg) by mouth once daily.      atorvastatin (Lipitor) 40 mg tablet Take 1 tablet (40 mg) by mouth once daily.      B complex-vitamin C-folic acid (Josy-Jesus) 0.8 mg tablet Take 1 tablet by mouth once daily.      cholecalciferol (Vitamin D-3) 25 MCG (1000 UT) capsule Take 1  capsule (25 mcg) by mouth once daily.      dulaglutide (Trulicity) 4.5 mg/0.5 mL pen injector Inject 4.5 mg under the skin 1 (one) time per week. 12 Pen 3    empagliflozin (Jardiance) 10 mg Take 1 tablet (10 mg) by mouth once daily. 90 tablet 3    fenofibrate (Triglide) 160 mg tablet Take 1 tablet (160 mg) by mouth once daily.      FreeStyle Josh 2 Sensor kit Use as instructed 6 each 0    FreeStyle Lite Strips strip once daily.      gabapentin (Neurontin) 300 mg capsule Take 1 capsule (300 mg) by mouth 3 times a day.      hydrALAZINE (Apresoline) 25 mg tablet Take 1 tablet (25 mg) by mouth 3 times a day.      icosapent ethyL (Vascepa) 1 gram capsule Take by mouth 2 times a day with meals.      insulin glargine-yfgn (Semglee,insulin glarg-yfgn,Pen) 100 unit/mL (3 mL) Pen Inject 70 Units under the skin once daily at bedtime. 90 mL 3    insulin lispro (HumaLOG KwikPen Insulin) 100 unit/mL injection Inject 80 Units under the skin once daily. 75 mL 3    levothyroxine (Synthroid, Levoxyl) 25 mcg tablet Take 1 tablet (25 mcg) by mouth once daily.      losartan (Cozaar) 50 mg tablet Take 1 tablet (50 mg) by mouth once daily. 90 tablet 3    metoprolol tartrate (Lopressor) 50 mg tablet Take 1 tablet (50 mg) by mouth 2 times a day. 180 tablet 0    rosuvastatin (Crestor) 40 mg tablet Take 1 tablet (40 mg) by mouth once daily at bedtime.      SITagliptin phosphate (Januvia) 50 mg tablet Take 1 tablet (50 mg) by mouth once daily.      torsemide (Demadex) 20 mg tablet Take 1 tablet (20 mg) by mouth once daily.       No current facility-administered medications for this visit.       Assessment/Plan     1.  Type 2 diabetes on insulin  2.  Thyroid nodules  3.  Hypertension  4.  Hyperlipidemia    Will check hemoglobin A1c by fingerstick today.    He will continue looking for patterns in his numbers.    He will increase his breakfast dose or decrease his carbohydrate at breakfast.    Will plan to repeat an ultrasound in June 2025 and  2027.    Will switch him to the freestyle sha 3.    He will follow-up with me in 3 months sooner as needed.

## 2024-08-15 PROBLEM — H40.50X0 NEOVASCULAR GLAUCOMA: Status: ACTIVE | Noted: 2024-08-15

## 2024-08-15 PROBLEM — Z86.69 HISTORY OF RETINAL DETACHMENT: Status: ACTIVE | Noted: 2024-08-15

## 2024-08-16 ENCOUNTER — APPOINTMENT (OUTPATIENT)
Dept: CARDIOLOGY | Facility: CLINIC | Age: 64
End: 2024-08-16
Payer: COMMERCIAL

## 2024-08-16 DIAGNOSIS — I25.10 ATHEROSCLEROSIS OF NATIVE CORONARY ARTERY WITHOUT ANGINA PECTORIS, UNSPECIFIED WHETHER NATIVE OR TRANSPLANTED HEART: Primary | ICD-10-CM

## 2024-09-13 ENCOUNTER — OFFICE VISIT (OUTPATIENT)
Dept: CARDIOLOGY | Facility: CLINIC | Age: 64
End: 2024-09-13
Payer: COMMERCIAL

## 2024-09-13 VITALS
HEIGHT: 71 IN | OXYGEN SATURATION: 94 % | DIASTOLIC BLOOD PRESSURE: 68 MMHG | SYSTOLIC BLOOD PRESSURE: 121 MMHG | WEIGHT: 315 LBS | BODY MASS INDEX: 44.1 KG/M2 | HEART RATE: 74 BPM

## 2024-09-13 DIAGNOSIS — I10 PRIMARY HYPERTENSION: ICD-10-CM

## 2024-09-13 DIAGNOSIS — I25.10 ATHEROSCLEROSIS OF NATIVE CORONARY ARTERY WITHOUT ANGINA PECTORIS, UNSPECIFIED WHETHER NATIVE OR TRANSPLANTED HEART: Primary | ICD-10-CM

## 2024-09-13 DIAGNOSIS — Q23.1 BICUSPID AORTIC VALVE (HHS-HCC): ICD-10-CM

## 2024-09-13 PROCEDURE — 3008F BODY MASS INDEX DOCD: CPT | Performed by: INTERNAL MEDICINE

## 2024-09-13 PROCEDURE — 1036F TOBACCO NON-USER: CPT | Performed by: INTERNAL MEDICINE

## 2024-09-13 PROCEDURE — 4010F ACE/ARB THERAPY RXD/TAKEN: CPT | Performed by: INTERNAL MEDICINE

## 2024-09-13 PROCEDURE — 3074F SYST BP LT 130 MM HG: CPT | Performed by: INTERNAL MEDICINE

## 2024-09-13 PROCEDURE — 99215 OFFICE O/P EST HI 40 MIN: CPT | Performed by: INTERNAL MEDICINE

## 2024-09-13 PROCEDURE — 3048F LDL-C <100 MG/DL: CPT | Performed by: INTERNAL MEDICINE

## 2024-09-13 PROCEDURE — 3078F DIAST BP <80 MM HG: CPT | Performed by: INTERNAL MEDICINE

## 2024-09-13 PROCEDURE — 93005 ELECTROCARDIOGRAM TRACING: CPT | Performed by: INTERNAL MEDICINE

## 2024-09-13 PROCEDURE — 3060F POS MICROALBUMINURIA REV: CPT | Performed by: INTERNAL MEDICINE

## 2024-09-13 RX ORDER — AMLODIPINE BESYLATE 10 MG/1
10 TABLET ORAL DAILY
COMMUNITY

## 2024-09-13 ASSESSMENT — PATIENT HEALTH QUESTIONNAIRE - PHQ9
1. LITTLE INTEREST OR PLEASURE IN DOING THINGS: NOT AT ALL
SUM OF ALL RESPONSES TO PHQ9 QUESTIONS 1 AND 2: 0
2. FEELING DOWN, DEPRESSED OR HOPELESS: NOT AT ALL

## 2024-09-13 ASSESSMENT — COLUMBIA-SUICIDE SEVERITY RATING SCALE - C-SSRS
6. HAVE YOU EVER DONE ANYTHING, STARTED TO DO ANYTHING, OR PREPARED TO DO ANYTHING TO END YOUR LIFE?: NO
2. HAVE YOU ACTUALLY HAD ANY THOUGHTS OF KILLING YOURSELF?: NO
1. IN THE PAST MONTH, HAVE YOU WISHED YOU WERE DEAD OR WISHED YOU COULD GO TO SLEEP AND NOT WAKE UP?: NO

## 2024-09-13 ASSESSMENT — PAIN SCALES - GENERAL: PAINLEVEL: 0-NO PAIN

## 2024-09-13 ASSESSMENT — ENCOUNTER SYMPTOMS
LOSS OF SENSATION IN FEET: 0
OCCASIONAL FEELINGS OF UNSTEADINESS: 1
DEPRESSION: 0

## 2024-09-13 NOTE — PROGRESS NOTES
Primary Care Physician: Alejandro Beckford MD  Date of Visit: 09/13/2024 10:00 AM EDT  [place] [facility] [place] [address]     Chief Complaint:   Chief Complaint   Patient presents with    Follow-up    Hyperlipidemia    Hypertension     Aortic Stenosis        HPI / Summary:   Nicolas Shultz is a 63 y.o. male presents for followup.   Reportedly PCI x 3  2009 LAD stent.  2011 circumflex stent.  Bicuspid aortic valve February 16, 2024 echocardiogram 55 to 60% EF, mild AI, moderate AAS DI of 0.33 mean gradient of 24 mild mitral thickening with trace MR normal aortic root size    DM2.  (Januvia 50, insulin, empagliflozin 10, Trulicity 4.5 weekly, June 24 A1c of 6.8 prior 6.5)  CKD.  (May 9 BUN 45 creatinine 2.63 EGFR of 27)  DL.  Atorvastatin 40, fenofibrate 160, Vascepa 1 g twice daily (May 9 cholesterol 169, HDL 49, LDL 61, triglycerides 294)  HTN    JENNINGS  Carotid ultrasound 2020 and 2022 lesss than 50 5 bilat, no change  Specialty Problems          Cardiology Problems    Essential hypertension    Familial combined hyperlipidemia    Aortic atherosclerosis (CMS-HCC)    Aortic stenosis, mild    Atherosclerosis of coronary artery    Bicuspid aortic valve ([facility])    Carotid atherosclerosis    Hyperlipidemia    Mild aortic insufficiency    Past myocardial infarction    Right-sided carotid artery disease (CMS-HCC)    Stable angina (CMS-[facility])        Past Medical History:   Diagnosis Date    Anterior scleritis, 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    Anterior scleritis of right eye    Aortic stenosis     CAD S/P percutaneous coronary angioplasty     CKD (chronic kidney disease)     Diabetes mellitus (Multi)     Glaucoma secondary to other eye disorders, unspecified eye, stage unspecified     NVG (neovascular glaucoma)    HTN (hypertension)     Hyperlipidemia     Hypothyroidism     Male erectile dysfunction, unspecified     Obstructive sleep apnea (adult) (pediatric)     Ocular hypertension, unspecified eye     Elevated IOP    Old myocardial  infarction           Past Surgical History:   Procedure Laterality Date    COLONOSCOPY      CORONARY ANGIOPLASTY WITH STENT PLACEMENT  2009    LAD    CORONARY ANGIOPLASTY WITH STENT PLACEMENT      FEMUR FRACTURE SURGERY      LUNG SURGERY      RETINAL DETACHMENT SURGERY            Social History:   reports that he has never smoked. He has never used smokeless tobacco. He reports that he does not currently use alcohol. He reports that he does not use drugs.      Allergies:  Allergies   Allergen Reactions    Penicillins Hives, Rash and Shortness of breath       Outpatient Medications:  Current Outpatient Medications   Medication Instructions    amLODIPine (NORVASC) 10 mg, oral, Daily    aspirin 81 mg, oral, Daily    atorvastatin (LIPITOR) 40 mg, oral, Daily    B complex-vitamin C-folic acid (Josy-Jesus) 0.8 mg tablet 0.8 mg, oral, Daily    cholecalciferol (VITAMIN D-3) 25 mcg, oral, Daily    empagliflozin (JARDIANCE) 10 mg, oral, Daily    fenofibrate (Triglide) 160 mg tablet 1 tablet, oral, Daily    FreeStyle Josh 2 Sensor kit Use as instructed    FreeStyle Josh 3 [place] misc Use as instructed    FreeStyle Lite Strips strip Daily    gabapentin (NEURONTIN) 300 mg, oral, 3 times daily    hydrALAZINE (APRESOLINE) 25 mg, oral, 3 times daily    icosapent ethyL (Vascepa) 1 gram capsule oral, 2 times daily (morning and late afternoon)    insulin glargine-yfgn (SEMGLEE(INSULIN GLARG-YFGN)PEN) 70 Units, subcutaneous, Nightly    insulin lispro (HUMALOG KWIKPEN INSULIN) 80 Units, subcutaneous, Daily    levothyroxine (SYNTHROID, LEVOXYL) 25 mcg, oral, Daily    losartan (COZAAR) 50 mg, oral, Daily    metoprolol tartrate (LOPRESSOR) 50 mg, oral, 2 times daily    rosuvastatin (CRESTOR) 40 mg, oral, Nightly    SITagliptin phosphate (JANUVIA) 50 mg, oral, Daily    torsemide (DEMADEX) 20 mg, oral, Daily    Trulicity 4.5 mg, subcutaneous, Once Weekly       ROS     Physical Exam:  Vitals:    09/13/24 1001   BP: 121/68   BP Location:  "Right arm   Patient Position: Sitting   BP Cuff Size: Large adult   Pulse: 74   SpO2: 94%   Weight: 145 kg (320 lb 6 oz)   Height: 1.803 m (5' 11\")     Wt Readings from Last 5 Encounters:   09/13/24 145 kg (320 lb 6 oz)   06/24/24 146 kg (321 lb)   05/13/24 146 kg (322 lb)   03/21/24 144 kg (318 lb)   02/16/24 142 kg (312 lb 9 oz)     Body mass index is 44.68 kg/m².   Well-developed male.  JVP was not distended.  Systolic murmur radiating to the right neck more than the left.  Grade 4/6 systolic murmur peaking in mid systole no diastolic murmur no gallop.  Lungs were clear abdomen was soft without palpable masses he has a large pannus liver span was not large to percussion no edge could be palpated   Dependent edema minimally pitting.  Distal pulses were strong bilaterally  CMP:  Recent Labs     05/09/24  0758 02/09/24  0908 06/14/23  1119 05/31/22  0719 03/08/22  0800    142 142 141 141   K 4.4 4.9 4.3 4.7 4.9    103 102 106 103   CO2 25 28 29 24 26   ANIONGAP 16 16 15 16 17   BUN 45* 36* 37* 31* 51*   CREATININE 2.63* 2.61* 2.45* 2.65* 2.97*   EGFR 27* 27*  --   --   --    GLUCOSE 151* 173* 108* 98 186*     Recent Labs     05/09/24  0758 02/09/24  0908 06/14/23  1119 05/31/22  0719 03/08/22  0800 12/28/21  0858 09/15/20  1508 02/28/20  0746 12/20/19  0812 10/02/19  0926 08/30/18  0845   ALBUMIN 4.1 4.1  --  4.1 4.1 4.1   < > 4.2 4.0  --  3.9   ALKPHOS  --   --   --  32* 32*  --   --  37 31*  --  42   ALT  --   --  21 24 19  --   --  21 19   < > 19   AST  --   --   --  25 19  --   --  19 18  --  19   BILITOT  --   --   --  0.6 0.4  --   --  0.5 0.4  --  0.5    < > = values in this interval not displayed.     CBC:  Recent Labs     05/09/24  0758 02/09/24  0908 05/31/22  0719 03/08/22  0800 12/28/21  0858   WBC 5.8 5.4 6.5 6.8 6.6   HGB 13.0* 13.0* 13.0* 13.2* 13.4*   HCT 41.1 41.4 40.7* 41.5 43.2    253 263 351 264   MCV 93 93 95 94 95     COAG: No results for input(s): \"INR\", \"DDIMERVTE\" in the " "last 75558 hours.  HEME/ENDO:  Recent Labs     06/24/24  1049 05/09/24  0758 03/21/24  1038 02/09/24  0908 12/21/23  1335 06/14/23  1119 05/31/22  0719 03/08/22  0800 12/28/21  0858 06/15/21  1512   FERRITIN  --  62  --  53  --   --   --   --  79 98   IRONSAT  --  23*  --  20*  --   --   --   --  20* 30   TSH  --   --   --   --   --  1.84 2.15 1.94 2.73  --    HGBA1C 6.8*  --  6.5  --  6.7* 6.5* 6.4* 9.0* 9.4* 11.4      CARDIAC: No results for input(s): \"LDH\", \"CKMB\", \"TROPHS\", \"BNP\" in the last 65454 hours.    No lab exists for component: \"CK\", \"CKMBP\"  Recent Labs     05/09/24  0758 05/31/22  0719 03/08/22  0800 02/28/20  0746   CHOL 169 137 150 230*   LDLF  --  46 61 -   HDL 49.1 43.1 46.6 48.1   TRIG 294* 238* 213* 494*       Last Cardiology Tests:  ECG:  Normal ECG isolated PVC    Echo:  Echo Results:  Transthoracic Echo (TTE) Complete 02/16/2024    [facility], [address] [place] [address]  Tel [phone] and Fax [phone]    TRANSTHORACIC ECHOCARDIOGRAM REPORT      Patient Name:      MARYSOL CORRAL [facility] Smart Physician:    [id number] John Saldana MD  Study Date:        2/16/2024            Ordering Provider:    [id number] MARTINA HAWKINS  MRN/PID:           [id number]             Fellow:  Accession#:        [id number]         Nurse:  Date of Birth/Age: 1960 / 63      Sonographer:          Jack dumont RDCS  Gender:            M                    Additional Staff:  Height:            182.88 cm            Admit Date:  Weight:            143.79 kg            Admission Status:     Outpatient  BSA / BMI:         2.59 m2 / 42.99      Encounter#:           [id number]  kg/m2  Department Location:  [facility]  Stress Lab  Blood Pressure: 139 /57 mmHg    Study Type:    TRANSTHORACIC ECHO (TTE) COMPLETE  Diagnosis/ICD: Atherosclerotic heart disease of native coronary artery without  angina pectoris-I25.10; " Congenital insufficiency of aortic  valve-Q23.1  Indication:    CAD, Bicuspid AV  CPT Code:      Echo Complete w Full [id number]    Patient History:  Pertinent History: DM, HTN, PCI x3, MI (2009), Carotid atherosclerosis, AS,  Murmur, LE edema.    Study Detail: The following Echo studies were performed: 2D, M-Mode, Doppler and  color flow.      PHYSICIAN INTERPRETATION:  Left Ventricle: The left ventricular systolic function is normal, with an estimated ejection fraction of 55-60%. There are no regional wall motion abnormalities. The left ventricular cavity size is normal. Spectral Doppler shows an impaired relaxation pattern of left ventricular diastolic filling.  Left Atrium: The left atrium is normal in size.  Right Ventricle: The right ventricle is normal in size. There is normal right ventricular global systolic function.  Right Atrium: The right atrium is normal in size.  Aortic Valve: The aortic valve appears abnormal. There is moderate aortic valve cusp calcification. Fusion between the right and left coronary cusps. There is moderate aortic valve thickening. There is evidence of moderate aortic valve stenosis.  The aortic valve dimensionless index is 0.33. There is mild aortic valve regurgitation. The peak instantaneous gradient of the aortic valve is 39.3 mmHg. The mean gradient of the aortic valve is 23.6 mmHg.  Mitral Valve: The mitral valve is mildly thickened. There is mild mitral annular calcification. There is trace mitral valve regurgitation.  Tricuspid Valve: The tricuspid valve is structurally normal. There is trace tricuspid regurgitation. The right ventricular systolic pressure is unable to be estimated.  Pulmonic Valve: The pulmonic valve is not well visualized. There is physiologic pulmonic valve regurgitation.  Pericardium: There is a trivial pericardial effusion.  Aorta: The aortic root is normal. There is mild dilatation of the ascending aorta.  Pulmonary Artery: The tricuspid  regurgitant velocity is 2.86 m/s, and with an estimated right atrial pressure of 8 mmHg, the estimated pulmonary artery pressure is mildly elevated with the RVSP at 40.7 mmHg.  Systemic Veins: The inferior vena cava appears to be of normal size. There is less than 50% IVC collapse with inspiration.  In comparison to the previous echocardiogram(s): Compared with study from 10/28/2022, no significant change.      CONCLUSIONS:  1. Left ventricular systolic function is normal with a 55-60% estimated ejection fraction.  2. Spectral Doppler shows an impaired relaxation pattern of left ventricular diastolic filling.  3. Aortic valve appears abnormal.  4. Moderate aortic valve stenosis.  5. There is moderate aortic valve cusp calcification.  6. Mild aortic valve regurgitation.    QUANTITATIVE DATA SUMMARY:  2D MEASUREMENTS:  Normal Ranges:  LAs:           4.24 cm    (2.7-4.0cm)  RVIDd:         2.68 cm    (0.9-3.6cm)  IVSd:          1.25 cm    (0.6-1.1cm)  LVPWd:         1.36 cm    (0.6-1.1cm)  LVIDd:         5.08 cm    (3.9-5.9cm)  LVIDs:         3.43 cm  LV Mass Index: 104.1 g/m2  LV % FS        32.5 %    LA VOLUME:  Normal Ranges:  LA Vol A4C:        70.8 ml    (22+/-6mL/m2)  LA Vol A2C:        81.7 ml  LA Vol BP:         77.4 ml  LA Vol Index A4C:  27.3 ml/m2  LA Vol Index A2C:  31.5 ml/m2  LA Vol Index BP:   29.9 ml/m2  LA Area A4C:       21.4 cm2  LA Area A2C:       23.4 cm2  LA Major Axis A4C: 5.5 cm  LA Major Axis A2C: 5.7 cm  LA Vol A4C:        66.9 ml  LA Vol A2C:        77.6 ml    RA VOLUME BY A/L METHOD:  Normal Ranges:  RA Area A4C: 15.1 cm2    M-MODE MEASUREMENTS:  Normal Ranges:  Ao Root: 3.30 cm (2.0-3.7cm)    AORTA MEASUREMENTS:  Normal Ranges:  Ao Sinus, d: 3.30 cm (2.1-3.5cm)  Ao STJ, d:   2.90 cm (1.7-3.4cm)  Asc Ao, d:   3.80 cm (2.1-3.4cm)  Ao Arch:     2.40 cm (2.0-3.6cm)    LV SYSTOLIC FUNCTION BY 2D PLANIMETRY (MOD):  Normal Ranges:  EF-A4C View: 53.4 % (>=55%)  EF-A2C View: 63.3 %  EF-Biplane:  56.9  %    LV DIASTOLIC FUNCTION:  Normal Ranges:  MV Peak E:        0.43 m/s   (0.7-1.2 m/s)  MV Peak A:        0.60 m/s   (0.42-0.7 m/s)  E/A Ratio:        0.72       (1.0-2.2)  MV e'             0.05 m/s   (>8.0)  MV lateral e'     0.05 m/s  MV medial e'      0.04 m/s  E/e' Ratio:       8.66       (<8.0)  PulmV Sys Amaury:    30.12 cm/s  PulmV Heath Amaury:   31.45 cm/s  PulmV S/D Amaury:    0.96  PulmV A Revs Amaury: 31.45 cm/s    MITRAL VALVE:  Normal Ranges:  MV DT: 367 msec (150-240msec)    AORTIC VALVE:  Normal Ranges:  AoV Vmax:                3.14 m/s  (<=1.7m/s)  AoV Peak P.3 mmHg (<20mmHg)  AoV Mean P.6 mmHg (1.7-11.5mmHg)  LVOT Max Amaury:            0.96 m/s  (<=1.1m/s)  AoV VTI:                 70.86 cm  (18-25cm)  LVOT VTI:                23.23 cm  LVOT Diameter:           2.07 cm   (1.8-2.4cm)  AoV Area, VTI:           1.11 cm2  (2.5-5.5cm2)  AoV Area,Vmax:           1.03 cm2  (2.5-4.5cm2)  AoV Dimensionless Index: 0.33      RIGHT VENTRICLE:  RV Basal 3.90 cm  RV Mid   2.70 cm  RV Major 8.1 cm  TAPSE:   22.4 mm  RV s'    0.15 m/s    TRICUSPID VALVE/RVSP:  Normal Ranges:  Peak TR Velocity: 2.86 m/s  RV Syst Pressure: 40.7 mmHg (< 30mmHg)  IVC Diam:         1.90 cm    PULMONIC VALVE:  Normal Ranges:  PV Max Amaury: 0.9 m/s  (0.6-0.9m/s)  PV Max PG:  3.1 mmHg    Pulmonary Veins:  PulmV A Revs Amaury: 31.45 cm/s  PulmV Heath Amaury:   31.45 cm/s  PulmV S/D Amaury:    0.96  PulmV Sys Amaury:    30.12 cm/s    AORTA:  Asc Ao Diam 3.81 cm      [id number] John Saldana MD  Electronically signed on 2024 at 4:17:32 PM        ** Final **       Cath:      Stress Test:  Stress Results:  No results found for this or any previous visit from the past 365 days.         Cardiac Imaging:  Transthoracic Echo (TTE) [facility], [address] [place] [address]                       Tel [phone] and Fax [phone]    TRANSTHORACIC  ECHOCARDIOGRAM REPORT       Patient Name:      MARYSOL CORRAL [facility]     Reading Physician:    [id number] John Saldana MD  Study Date:        2/16/2024            Ordering Provider:    [id number] MARTINA GREENE                                                                ROSS  MRN/PID:           [id number]             Fellow:  Accession#:        [id number]         Nurse:  Date of Birth/Age: 1960 / 63      Sonographer:          Jack dumont                                      RDRANDA  Gender:            M                    Additional Staff:  Height:            182.88 cm            Admit Date:  Weight:            143.79 kg            Admission Status:     Outpatient  BSA / BMI:         2.59 m2 / 42.99      Encounter#:           [id number]                     kg/m2                                          Department Location:  [facility]                                                                Stress Lab  Blood Pressure: 139 /57 mmHg    Study Type:    TRANSTHORACIC ECHO (TTE) COMPLETE  Diagnosis/ICD: Atherosclerotic heart disease of native coronary artery without                 angina pectoris-I25.10; Congenital insufficiency of aortic                 valve-Q23.1  Indication:    CAD, Bicuspid AV  CPT Code:      Echo Complete w Full Doppler-[id number]    Patient History:  Pertinent History: DM, HTN, PCI x3, MI (2009), Carotid atherosclerosis, AS,                     Murmur, LE edema.    Study Detail: The following Echo studies were performed: 2D, M-Mode, Doppler and                color flow.       PHYSICIAN INTERPRETATION:  Left Ventricle: The left ventricular systolic function is normal, with an estimated ejection fraction of 55-60%. There are no regional wall motion abnormalities. The left ventricular cavity size is normal. Spectral Doppler shows an impaired relaxation pattern of left ventricular diastolic filling.  Left Atrium:  The left atrium is normal in size.  Right Ventricle: The right ventricle is normal in size. There is normal right ventricular global systolic function.  Right Atrium: The right atrium is normal in size.  Aortic Valve: The aortic valve appears abnormal. There is moderate aortic valve cusp calcification. Fusion between the right and left coronary cusps. There is moderate aortic valve thickening. There is evidence of moderate aortic valve stenosis.  The aortic valve dimensionless index is 0.33. There is mild aortic valve regurgitation. The peak instantaneous gradient of the aortic valve is 39.3 mmHg. The mean gradient of the aortic valve is 23.6 mmHg.  Mitral Valve: The mitral valve is mildly thickened. There is mild mitral annular calcification. There is trace mitral valve regurgitation.  Tricuspid Valve: The tricuspid valve is structurally normal. There is trace tricuspid regurgitation. The right ventricular systolic pressure is unable to be estimated.  Pulmonic Valve: The pulmonic valve is not well visualized. There is physiologic pulmonic valve regurgitation.  Pericardium: There is a trivial pericardial effusion.  Aorta: The aortic root is normal. There is mild dilatation of the ascending aorta.  Pulmonary Artery: The tricuspid regurgitant velocity is 2.86 m/s, and with an estimated right atrial pressure of 8 mmHg, the estimated pulmonary artery pressure is mildly elevated with the RVSP at 40.7 mmHg.  Systemic Veins: The inferior vena cava appears to be of normal size. There is less than 50% IVC collapse with inspiration.  In comparison to the previous echocardiogram(s): Compared with study from 10/28/2022, no significant change.       CONCLUSIONS:   1. Left ventricular systolic function is normal with a 55-60% estimated ejection fraction.   2. Spectral Doppler shows an impaired relaxation pattern of left ventricular diastolic filling.   3. Aortic valve appears abnormal.   4. Moderate aortic valve stenosis.   5.  There is moderate aortic valve cusp calcification.   6. Mild aortic valve regurgitation.    QUANTITATIVE DATA SUMMARY:  2D MEASUREMENTS:                            Normal Ranges:  LAs:           4.24 cm    (2.7-4.0cm)  RVIDd:         2.68 cm    (0.9-3.6cm)  IVSd:          1.25 cm    (0.6-1.1cm)  LVPWd:         1.36 cm    (0.6-1.1cm)  LVIDd:         5.08 cm    (3.9-5.9cm)  LVIDs:         3.43 cm  LV Mass Index: 104.1 g/m2  LV % FS        32.5 %    LA VOLUME:                                Normal Ranges:  LA Vol A4C:        70.8 ml    (22+/-6mL/m2)  LA Vol A2C:        81.7 ml  LA Vol BP:         77.4 ml  LA Vol Index A4C:  27.3 ml/m2  LA Vol Index A2C:  31.5 ml/m2  LA Vol Index BP:   29.9 ml/m2  LA Area A4C:       21.4 cm2  LA Area A2C:       23.4 cm2  LA Major Axis A4C: 5.5 cm  LA Major Axis A2C: 5.7 cm  LA Vol A4C:        66.9 ml  LA Vol A2C:        77.6 ml    RA VOLUME BY A/L METHOD:                        Normal Ranges:  RA Area A4C: 15.1 cm2    M-MODE MEASUREMENTS:                   Normal Ranges:  Ao Root: 3.30 cm (2.0-3.7cm)    AORTA MEASUREMENTS:                       Normal Ranges:  Ao Sinus, d: 3.30 cm (2.1-3.5cm)  Ao STJ, d:   2.90 cm (1.7-3.4cm)  Asc Ao, d:   3.80 cm (2.1-3.4cm)  Ao Arch:     2.40 cm (2.0-3.6cm)    LV SYSTOLIC FUNCTION BY 2D PLANIMETRY (MOD):                      Normal Ranges:  EF-A4C View: 53.4 % (>=55%)  EF-A2C View: 63.3 %  EF-Biplane:  56.9 %    LV DIASTOLIC FUNCTION:                               Normal Ranges:  MV Peak E:        0.43 m/s   (0.7-1.2 m/s)  MV Peak A:        0.60 m/s   (0.42-0.7 m/s)  E/A Ratio:        0.72       (1.0-2.2)  MV e'             0.05 m/s   (>8.0)  MV lateral e'     0.05 m/s  MV medial e'      0.04 m/s  E/e' Ratio:       8.66       (<8.0)  PulmV Sys Amaury:    30.12 cm/s  PulmV Heath Amaury:   31.45 cm/s  PulmV S/D Amaury:    0.96  PulmV A Revs Amaury: 31.45 cm/s    MITRAL VALVE:                  Normal Ranges:  MV DT: 367 msec (150-240msec)    AORTIC VALVE:                                      Normal Ranges:  AoV Vmax:                3.14 m/s  (<=1.7m/s)  AoV Peak P.3 mmHg (<20mmHg)  AoV Mean P.6 mmHg (1.7-11.5mmHg)  LVOT Max Amaury:            0.96 m/s  (<=1.1m/s)  AoV VTI:                 70.86 cm  (18-25cm)  LVOT VTI:                23.23 cm  LVOT Diameter:           2.07 cm   (1.8-2.4cm)  AoV Area, VTI:           1.11 cm2  (2.5-5.5cm2)  AoV Area,Vmax:           1.03 cm2  (2.5-4.5cm2)  AoV Dimensionless Index: 0.33       RIGHT VENTRICLE:  RV Basal 3.90 cm  RV Mid   2.70 cm  RV Major 8.1 cm  TAPSE:   22.4 mm  RV s'    0.15 m/s    TRICUSPID VALVE/RVSP:                              Normal Ranges:  Peak TR Velocity: 2.86 m/s  RV Syst Pressure: 40.7 mmHg (< 30mmHg)  IVC Diam:         1.90 cm    PULMONIC VALVE:                       Normal Ranges:  PV Max Amaury: 0.9 m/s  (0.6-0.9m/s)  PV Max PG:  3.1 mmHg    Pulmonary Veins:  PulmV A Revs Amaury: 31.45 cm/s  PulmV Heath Amaury:   31.45 cm/s  PulmV S/D Amaury:    0.96  PulmV Sys Amaury:    30.12 cm/s    AORTA:  Asc Ao Diam 3.81 cm       [id number] John Saldana MD  Electronically signed on 2024 at 4:17:32 PM       ** Final **        Assessment/Plan     DM2 with multiple endorgan complications including advanced CKD.  History of prior PCI.  Class III obesity.  Hypertension, mixed lipidemia.  Stable exertional dyspnea with normal estimated venous pressures.  Bicuspid aortic valve disease with no significant aortopathy moderate AS.  Okay to start a walking program in fact I encouraged him to do so.  Suspect his exertional dyspnea without chest discomfort is attributable to his elevated BMI and deconditioning.  I encouraged him to start walking outside before walking on the treadmill.  He will see us again in 6 months at which time we will repeat an echocardiogram thank you for your referral      Orders:  No orders of the defined types were placed in this encounter.     Followup Appts:  Future Appointments   Date Time  Provider Department [facility]   11/20/2024  1:00 PM Antony Rodríguez MD [id number] [facility]   1/14/2025 12:00 PM Supa Zuñiga MD [id number] [facility]           ____________________________________________________________  Woody Ryan MD    Senior Attending Physician  [facility]  [facility]

## 2024-09-20 LAB
ATRIAL RATE: 74 BPM
P AXIS: 37 DEGREES
P OFFSET: 197 MS
P ONSET: 146 MS
PR INTERVAL: 148 MS
Q ONSET: 220 MS
QRS COUNT: 12 BEATS
QRS DURATION: 108 MS
QT INTERVAL: 396 MS
QTC CALCULATION(BAZETT): 439 MS
QTC FREDERICIA: 425 MS
R AXIS: -29 DEGREES
T AXIS: 70 DEGREES
T OFFSET: 418 MS
VENTRICULAR RATE: 74 BPM

## 2024-10-03 ENCOUNTER — APPOINTMENT (OUTPATIENT)
Dept: ENDOCRINOLOGY | Facility: CLINIC | Age: 64
End: 2024-10-03
Payer: COMMERCIAL

## 2024-11-08 DIAGNOSIS — E11.9 TYPE 2 DIABETES MELLITUS WITHOUT COMPLICATION, WITH LONG-TERM CURRENT USE OF INSULIN (MULTI): ICD-10-CM

## 2024-11-08 DIAGNOSIS — Z79.4 TYPE 2 DIABETES MELLITUS WITHOUT COMPLICATION, WITH LONG-TERM CURRENT USE OF INSULIN (MULTI): ICD-10-CM

## 2024-11-08 RX ORDER — INSULIN LISPRO 100 [IU]/ML
50 INJECTION, SOLUTION INTRAVENOUS; SUBCUTANEOUS
Qty: 150 ML | Refills: 3 | Status: SHIPPED | OUTPATIENT
Start: 2024-11-08

## 2024-11-08 RX ORDER — INSULIN LISPRO 100 [IU]/ML
160 INJECTION, SOLUTION INTRAVENOUS; SUBCUTANEOUS DAILY
Qty: 150 ML | Refills: 3 | Status: SHIPPED | OUTPATIENT
Start: 2024-11-08 | End: 2024-11-08 | Stop reason: SDUPTHER

## 2024-11-12 DIAGNOSIS — E10.9 TYPE 1 DIABETES MELLITUS WITHOUT COMPLICATION: Primary | ICD-10-CM

## 2024-11-12 RX ORDER — BLOOD-GLUCOSE SENSOR
EACH MISCELLANEOUS
Qty: 6 EACH | Refills: 3 | Status: SHIPPED | OUTPATIENT
Start: 2024-11-12

## 2024-11-20 ENCOUNTER — APPOINTMENT (OUTPATIENT)
Dept: NEPHROLOGY | Facility: CLINIC | Age: 64
End: 2024-11-20
Payer: COMMERCIAL

## 2024-11-25 DIAGNOSIS — E11.9 TYPE 2 DIABETES MELLITUS WITHOUT COMPLICATION, WITH LONG-TERM CURRENT USE OF INSULIN (MULTI): ICD-10-CM

## 2024-11-25 DIAGNOSIS — Z79.4 TYPE 2 DIABETES MELLITUS WITHOUT COMPLICATION, WITH LONG-TERM CURRENT USE OF INSULIN (MULTI): ICD-10-CM

## 2024-11-25 RX ORDER — FLASH GLUCOSE SENSOR
KIT MISCELLANEOUS
Qty: 1 EACH | Refills: 0 | Status: SHIPPED | OUTPATIENT
Start: 2024-11-25

## 2024-11-26 DIAGNOSIS — I70.0 AORTIC ATHEROSCLEROSIS (CMS-HCC): ICD-10-CM

## 2024-11-26 DIAGNOSIS — I25.10 ATHEROSCLEROSIS OF NATIVE CORONARY ARTERY WITHOUT ANGINA PECTORIS, UNSPECIFIED WHETHER NATIVE OR TRANSPLANTED HEART: Primary | ICD-10-CM

## 2024-11-26 DIAGNOSIS — Q23.81 BICUSPID AORTIC VALVE: ICD-10-CM

## 2024-11-27 ENCOUNTER — LAB (OUTPATIENT)
Dept: LAB | Facility: LAB | Age: 64
End: 2024-11-27
Payer: COMMERCIAL

## 2024-11-27 DIAGNOSIS — Q23.81 BICUSPID AORTIC VALVE: ICD-10-CM

## 2024-11-27 DIAGNOSIS — I10 PRIMARY HYPERTENSION: ICD-10-CM

## 2024-11-27 DIAGNOSIS — N18.4 CHRONIC RENAL IMPAIRMENT, STAGE 4 (SEVERE) (MULTI): ICD-10-CM

## 2024-11-27 DIAGNOSIS — N06.0 ISOLATED PROTEINURIA WITH MINOR GLOMERULAR ABNORMALITY: ICD-10-CM

## 2024-11-27 DIAGNOSIS — Z79.4 TYPE 2 DIABETES MELLITUS WITHOUT COMPLICATION, WITH LONG-TERM CURRENT USE OF INSULIN (MULTI): ICD-10-CM

## 2024-11-27 DIAGNOSIS — E11.9 TYPE 2 DIABETES MELLITUS WITHOUT COMPLICATION, WITH LONG-TERM CURRENT USE OF INSULIN (MULTI): ICD-10-CM

## 2024-11-27 DIAGNOSIS — I70.0 AORTIC ATHEROSCLEROSIS (CMS-HCC): ICD-10-CM

## 2024-11-27 DIAGNOSIS — I25.10 ATHEROSCLEROSIS OF NATIVE CORONARY ARTERY WITHOUT ANGINA PECTORIS, UNSPECIFIED WHETHER NATIVE OR TRANSPLANTED HEART: ICD-10-CM

## 2024-11-27 LAB
25(OH)D3 SERPL-MCNC: 26 NG/ML (ref 30–100)
ALBUMIN SERPL BCP-MCNC: 3.6 G/DL (ref 3.4–5)
ALP SERPL-CCNC: 52 U/L (ref 33–136)
ALT SERPL W P-5'-P-CCNC: 24 U/L (ref 10–52)
ANION GAP SERPL CALC-SCNC: 18 MMOL/L (ref 10–20)
AST SERPL W P-5'-P-CCNC: 24 U/L (ref 9–39)
BILIRUB SERPL-MCNC: 0.7 MG/DL (ref 0–1.2)
BUN SERPL-MCNC: 36 MG/DL (ref 6–23)
CALCIUM SERPL-MCNC: 9.2 MG/DL (ref 8.6–10.6)
CHLORIDE SERPL-SCNC: 97 MMOL/L (ref 98–107)
CHOLEST SERPL-MCNC: 107 MG/DL (ref 0–199)
CHOLESTEROL/HDL RATIO: 2.6
CO2 SERPL-SCNC: 24 MMOL/L (ref 21–32)
CREAT SERPL-MCNC: 2.65 MG/DL (ref 0.5–1.3)
CREAT UR-MCNC: 151.4 MG/DL (ref 20–370)
EGFRCR SERPLBLD CKD-EPI 2021: 26 ML/MIN/1.73M*2
FERRITIN SERPL-MCNC: 98 NG/ML (ref 20–300)
GLUCOSE SERPL-MCNC: 187 MG/DL (ref 74–99)
HDLC SERPL-MCNC: 40.7 MG/DL
IRON SATN MFR SERPL: 7 % (ref 25–45)
IRON SERPL-MCNC: 24 UG/DL (ref 35–150)
LDLC SERPL CALC-MCNC: 39 MG/DL
MICROALBUMIN UR-MCNC: 1108.6 MG/L
MICROALBUMIN/CREAT UR: 732.2 UG/MG CREAT
NON HDL CHOLESTEROL: 66 MG/DL (ref 0–149)
PHOSPHATE SERPL-MCNC: 4.1 MG/DL (ref 2.5–4.9)
POTASSIUM SERPL-SCNC: 5 MMOL/L (ref 3.5–5.3)
PROT SERPL-MCNC: 6.5 G/DL (ref 6.4–8.2)
PTH-INTACT SERPL-MCNC: 90.1 PG/ML (ref 18.5–88)
SODIUM SERPL-SCNC: 134 MMOL/L (ref 136–145)
TIBC SERPL-MCNC: 343 UG/DL (ref 240–445)
TRIGL SERPL-MCNC: 136 MG/DL (ref 0–149)
TSH SERPL-ACNC: 4.41 MIU/L (ref 0.44–3.98)
UIBC SERPL-MCNC: 319 UG/DL (ref 110–370)
VLDL: 27 MG/DL (ref 0–40)

## 2024-11-27 PROCEDURE — 83550 IRON BINDING TEST: CPT

## 2024-11-27 PROCEDURE — 83540 ASSAY OF IRON: CPT

## 2024-11-27 PROCEDURE — 82043 UR ALBUMIN QUANTITATIVE: CPT

## 2024-11-27 PROCEDURE — 82306 VITAMIN D 25 HYDROXY: CPT

## 2024-11-27 PROCEDURE — 83970 ASSAY OF PARATHORMONE: CPT

## 2024-11-27 PROCEDURE — 80053 COMPREHEN METABOLIC PANEL: CPT

## 2024-11-27 PROCEDURE — 82728 ASSAY OF FERRITIN: CPT

## 2024-11-27 PROCEDURE — 36415 COLL VENOUS BLD VENIPUNCTURE: CPT

## 2024-11-27 PROCEDURE — 82570 ASSAY OF URINE CREATININE: CPT

## 2024-11-27 PROCEDURE — 84100 ASSAY OF PHOSPHORUS: CPT

## 2024-11-27 PROCEDURE — 84443 ASSAY THYROID STIM HORMONE: CPT

## 2024-11-27 PROCEDURE — 80061 LIPID PANEL: CPT

## 2024-12-02 ENCOUNTER — TELEPHONE (OUTPATIENT)
Dept: CARDIOLOGY | Facility: CLINIC | Age: 64
End: 2024-12-02
Payer: COMMERCIAL

## 2024-12-02 DIAGNOSIS — Z79.4 TYPE 2 DIABETES MELLITUS WITHOUT COMPLICATION, WITH LONG-TERM CURRENT USE OF INSULIN (MULTI): ICD-10-CM

## 2024-12-02 DIAGNOSIS — E11.9 TYPE 2 DIABETES MELLITUS WITHOUT COMPLICATION, WITH LONG-TERM CURRENT USE OF INSULIN (MULTI): ICD-10-CM

## 2024-12-02 RX ORDER — FLASH GLUCOSE SENSOR
KIT MISCELLANEOUS
Qty: 2 EACH | Refills: 11 | Status: SHIPPED | OUTPATIENT
Start: 2024-12-02

## 2024-12-02 NOTE — TELEPHONE ENCOUNTER
Result Communication    Resulted Orders   Lipid Panel   Result Value Ref Range    Cholesterol 107 0 - 199 mg/dL      Comment:            Age      Desirable   Borderline High   High     0-19 Y     0 - 169       170 - 199     >/= 200    20-24 Y     0 - 189       190 - 224     >/= 225         >24 Y     0 - 199       200 - 239     >/= 240   **All ranges are based on fasting samples. Specific   therapeutic targets will vary based on patient-specific   cardiac risk.    Pediatric guidelines reference:Pediatrics 2011, 128(S5).Adult guidelines reference: NCEP ATPIII Guidelines,SHIRA 2001, 258:2486-97    Venipuncture immediately after or during the administration of Metamizole may lead to falsely low results. Testing should be performed immediately prior to Metamizole dosing.    HDL-Cholesterol 40.7 mg/dL      Comment:        Age       Very Low   Low     Normal    High    0-19 Y    < 35      < 40     40-45     ----  20-24 Y    ----     < 40      >45      ----        >24 Y      ----     < 40     40-60      >60      Cholesterol/HDL Ratio 2.6       Comment:        Ref Values  Desirable  < 3.4  High Risk  > 5.0    LDL Calculated 39 <=99 mg/dL      Comment:                                  Near   Borderline      AGE      Desirable  Optimal    High     High     Very High     0-19 Y     0 - 109     ---    110-129   >/= 130     ----    20-24 Y     0 - 119     ---    120-159   >/= 160     ----      >24 Y     0 -  99   100-129  130-159   160-189     >/=190      VLDL 27 0 - 40 mg/dL    Triglycerides 136 0 - 149 mg/dL      Comment:      Age              Desirable        Borderline         High        Very High  SEX:B           mg/dL             mg/dL               mg/dL      mg/dL  <=14D                       ----               ----        ----  15D-365D                    ----               ----        ----  1Y-9Y           0-74               75-99             >=100       ----  10Y-19Y        0-89                             >=130       ----  20Y-24Y        0-114             115-149             >=150      ----  >= 25Y         0-149             150-199             200-499    >=500      Venipuncture immediately after or during the administration of Metamizole may lead to falsely low results. Testing should be performed immediately prior to Metamizole dosing.    Non HDL Cholesterol 66 0 - 149 mg/dL      Comment:            Age       Desirable   Borderline High   High     Very High     0-19 Y     0 - 119       120 - 144     >/= 145    >/= 160    20-24 Y     0 - 149       150 - 189     >/= 190      ----         >24 Y    30 mg/dL above LDL Cholesterol goal     Comprehensive Metabolic Panel   Result Value Ref Range    Glucose 187 (H) 74 - 99 mg/dL    Sodium 134 (L) 136 - 145 mmol/L    Potassium 5.0 3.5 - 5.3 mmol/L    Chloride 97 (L) 98 - 107 mmol/L    Bicarbonate 24 21 - 32 mmol/L    Anion Gap 18 10 - 20 mmol/L    Urea Nitrogen 36 (H) 6 - 23 mg/dL    Creatinine 2.65 (H) 0.50 - 1.30 mg/dL    eGFR 26 (L) >60 mL/min/1.73m*2      Comment:      Calculations of estimated GFR are performed using the 2021 CKD-EPI Study Refit equation without the race variable for the IDMS-Traceable creatinine methods.  https://jasn.asnjournals.org/content/early/2021/09/22/ASN.9144948793    Calcium 9.2 8.6 - 10.6 mg/dL    Albumin 3.6 3.4 - 5.0 g/dL    Alkaline Phosphatase 52 33 - 136 U/L    Total Protein 6.5 6.4 - 8.2 g/dL    AST 24 9 - 39 U/L    Bilirubin, Total 0.7 0.0 - 1.2 mg/dL    ALT 24 10 - 52 U/L      Comment:      Patients treated with Sulfasalazine may generate falsely decreased results for ALT.       9:46 AM      Results were successfully communicated with the patient and they acknowledged their understanding.  Patient has appointment with Dr. Ryan 12/6/24.

## 2024-12-02 NOTE — TELEPHONE ENCOUNTER
----- Message from Woody Ryan sent at 12/2/2024  8:29 AM EST -----  Let him know the potassium is high normal , same meds for now, may want to f/with nephro next few weeks . Inmeantime avoid potassium rich foods

## 2024-12-05 DIAGNOSIS — N18.4 STAGE 4 CHRONIC KIDNEY DISEASE (MULTI): ICD-10-CM

## 2024-12-06 ENCOUNTER — OFFICE VISIT (OUTPATIENT)
Dept: CARDIOLOGY | Facility: CLINIC | Age: 64
End: 2024-12-06
Payer: COMMERCIAL

## 2024-12-06 VITALS
HEIGHT: 72 IN | HEART RATE: 81 BPM | BODY MASS INDEX: 41.22 KG/M2 | SYSTOLIC BLOOD PRESSURE: 145 MMHG | DIASTOLIC BLOOD PRESSURE: 67 MMHG | OXYGEN SATURATION: 96 % | WEIGHT: 304.3 LBS

## 2024-12-06 DIAGNOSIS — I25.5 ISCHEMIC CARDIOMYOPATHY: ICD-10-CM

## 2024-12-06 DIAGNOSIS — I35.0 AORTIC STENOSIS, MILD: Primary | ICD-10-CM

## 2024-12-06 PROCEDURE — 99215 OFFICE O/P EST HI 40 MIN: CPT | Performed by: INTERNAL MEDICINE

## 2024-12-06 PROCEDURE — 3077F SYST BP >= 140 MM HG: CPT | Performed by: INTERNAL MEDICINE

## 2024-12-06 PROCEDURE — 3048F LDL-C <100 MG/DL: CPT | Performed by: INTERNAL MEDICINE

## 2024-12-06 PROCEDURE — 1036F TOBACCO NON-USER: CPT | Performed by: INTERNAL MEDICINE

## 2024-12-06 PROCEDURE — 3078F DIAST BP <80 MM HG: CPT | Performed by: INTERNAL MEDICINE

## 2024-12-06 PROCEDURE — 3008F BODY MASS INDEX DOCD: CPT | Performed by: INTERNAL MEDICINE

## 2024-12-06 PROCEDURE — 3062F POS MACROALBUMINURIA REV: CPT | Performed by: INTERNAL MEDICINE

## 2024-12-06 RX ORDER — ISOSORBIDE MONONITRATE 30 MG/1
30 TABLET, EXTENDED RELEASE ORAL DAILY
COMMUNITY
Start: 2024-11-22

## 2024-12-06 RX ORDER — ISOSORBIDE MONONITRATE 30 MG/1
30 TABLET, EXTENDED RELEASE ORAL DAILY
Qty: 90 TABLET | Refills: 3 | Status: SHIPPED | OUTPATIENT
Start: 2024-12-06 | End: 2025-12-06

## 2024-12-06 RX ORDER — CARVEDILOL 3.12 MG/1
3.12 TABLET ORAL
Qty: 180 TABLET | Refills: 3 | Status: SHIPPED | OUTPATIENT
Start: 2024-12-06 | End: 2025-12-06

## 2024-12-06 RX ORDER — ATORVASTATIN CALCIUM 80 MG/1
80 TABLET, FILM COATED ORAL DAILY
Qty: 90 TABLET | Refills: 3 | Status: SHIPPED | OUTPATIENT
Start: 2024-12-06 | End: 2024-12-06

## 2024-12-06 RX ORDER — ATORVASTATIN CALCIUM 80 MG/1
80 TABLET, FILM COATED ORAL DAILY
Qty: 90 TABLET | Refills: 3 | Status: SHIPPED | OUTPATIENT
Start: 2024-12-06 | End: 2025-12-06

## 2024-12-06 RX ORDER — CLOPIDOGREL BISULFATE 75 MG/1
75 TABLET ORAL DAILY
COMMUNITY
Start: 2024-11-22

## 2024-12-06 RX ORDER — CARVEDILOL 3.12 MG/1
3.12 TABLET ORAL 2 TIMES DAILY
COMMUNITY
Start: 2024-11-22

## 2024-12-06 ASSESSMENT — PAIN SCALES - GENERAL: PAINLEVEL_OUTOF10: 0-NO PAIN

## 2024-12-06 ASSESSMENT — PATIENT HEALTH QUESTIONNAIRE - PHQ9
SUM OF ALL RESPONSES TO PHQ9 QUESTIONS 1 AND 2: 0
1. LITTLE INTEREST OR PLEASURE IN DOING THINGS: NOT AT ALL
2. FEELING DOWN, DEPRESSED OR HOPELESS: NOT AT ALL

## 2024-12-06 ASSESSMENT — ENCOUNTER SYMPTOMS
LOSS OF SENSATION IN FEET: 0
OCCASIONAL FEELINGS OF UNSTEADINESS: 0
DEPRESSION: 0

## 2024-12-06 NOTE — PROGRESS NOTES
Primary Care Physician: Alejandro Beckford MD  Date of Visit: 12/06/2024  9:20 AM EST  Location of visit[place] [facility] [place] [address]     Chief Complaint:   Chief Complaint   Patient presents with    Follow-up        HPI / Summary:   Nicolas Shultz is a 63 y.o. male presents for followup.   Last seen [place] and is now being seen in cardiovascular post hospital follow-up.  LAD stent 2009.  Circumflex stent 2011.  Echo in February EF of 55% DI 0.33 mean aortic valve gradient of 23.6 bicuspid aortic valve with mild AI mild AS.  DM2, CKD, dyslipidemia,     hypertension, elevated BMI 42    He was [place] of [place] on [place] 18 found to have a decrement in EF with a wall motion abnormality found to have a high-grade stent requiring lesion in the LAD [place] 3.0x 30 mm  27th labs BUN 36 creatinine 2.65, cholesterol of 107 LDL 39,  Poor taste and smell.    2 weeks gabapentin withdrawal  Insomnia, poor appetite  Lantus 70 at bedtime  Midday 30 units  Asa81 mg  Atorva 80, Coreg 3.125 bid  Plavix 75  Hydralazine 25 mg po Tid Imdur 30 mg. Torsemide 20 mg    I txted Faiman about low BS a,d Massien about resuming gabapentin  Told to take torsemide prn.    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    Anterior scleritis of right eye    Aortic stenosis     CAD S/P percutaneous coronary angioplasty     CKD (chronic kidney disease)     Diabetes mellitus (Multi)     Glaucoma secondary to other eye disorders, unspecified eye, stage  unspecified     NVG (neovascular glaucoma)    HTN (hypertension)     Hyperlipidemia     Hypothyroidism     Male erectile dysfunction, unspecified     Obstructive sleep apnea (adult) (pediatric)     Ocular hypertension, unspecified eye     Elevated IOP    Old myocardial infarction           Past Surgical History:   Procedure Laterality Date    COLONOSCOPY      CORONARY ANGIOPLASTY WITH STENT PLACEMENT  2009    LAD    CORONARY ANGIOPLASTY WITH STENT PLACEMENT      FEMUR FRACTURE SURGERY      LUNG SURGERY      RETINAL DETACHMENT SURGERY            Social History:   reports that he has never smoked. He has never used smokeless tobacco. He reports that he does not currently use alcohol. He reports that he does not use drugs.      Allergies:  Allergies   Allergen Reactions    Penicillins Hives, Rash and Shortness of breath       Outpatient Medications:  Current Outpatient Medications   Medication Instructions    amLODIPine (NORVASC) 10 mg, oral, Daily    amLODIPine (NORVASC) 10 mg, oral, Daily    aspirin 81 mg, Daily    atorvastatin (LIPITOR) 80 mg, Daily    B complex-vitamin C-folic acid (Josy-Jesus) 0.8 mg tablet 0.8 mg, oral, Daily    carvedilol (COREG) 3.125 mg, 2 times daily    cholecalciferol (VITAMIN D-3) 25 mcg, oral, Daily    clopidogrel (PLAVIX) 75 mg, Daily    empagliflozin (JARDIANCE) 10 mg, oral, Daily    fenofibrate (Triglide) 160 mg tablet 1 tablet, oral, Daily    FreeStyle Ojsh 2 Sensor kit Use as instructed    FreeStyle Josh 2 Sensor kit use as directed    FreeStyle Josh 3 [place] misc Use as instructed    FreeStyle Josh 3 Sensor device Change every 14 days    FreeStyle Lite Strips strip Daily    gabapentin (NEURONTIN) 300 mg, oral, 3 times daily    hydrALAZINE (APRESOLINE) 25 mg, 3 times daily    icosapent ethyL (Vascepa) 1 gram capsule oral, 2 times daily (morning and late afternoon)    insulin glargine-yfgn (SEMGLEE(INSULIN GLARG-YFGN)PEN) 70 Units, subcutaneous, Nightly    insulin lispro (HUMALOG  "KWIKPEN INSULIN) 50 Units, subcutaneous, 3 times daily (morning, midday, late afternoon)    isosorbide mononitrate ER (IMDUR) 30 mg, Daily    levothyroxine (SYNTHROID, LEVOXYL) 25 mcg, oral, Daily    losartan (COZAAR) 50 mg, oral, Daily    metoprolol tartrate (LOPRESSOR) 50 mg, oral, 2 times daily    rosuvastatin (CRESTOR) 40 mg, oral, Nightly    SITagliptin phosphate (JANUVIA) 50 mg, oral, Daily    torsemide (DEMADEX) 20 mg, oral, Daily    Trulicity 4.5 mg, subcutaneous, Once Weekly       ROS     Physical Exam:  Vitals:    12/06/24 0926   BP: 145/67   BP Location: Right arm   Patient Position: Sitting   BP Cuff Size: Large adult   Pulse: 81   SpO2: 96%   Weight: 138 kg (304 lb 4.8 oz)   Height: 1.816 m (5' 11.5\")     Wt Readings from Last 5 Encounters:   12/06/24 138 kg (304 lb 4.8 oz)   09/13/24 145 kg (320 lb 6 oz)   06/24/24 146 kg (321 lb)   05/13/24 146 kg (322 lb)   03/21/24 144 kg (318 lb)     Body mass index is 41.85 kg/m².   Physical Exam   : No acute distress.  Neck veins do not appear distended.  Regular rhythm with systolic ejection murmur 4/6 is no gallop.  Clear lungs.  Soft abdomen.  No dependent edema  Last Labs:  CMP:  Recent Labs     11/27/24  0947 05/09/24  0758 02/09/24  0908 06/14/23  1119 05/31/22  0719   * 142 142 142 141   K 5.0 4.4 4.9 4.3 4.7   CL 97* 105 103 102 106   CO2 24 25 28 29 24   ANIONGAP 18 16 16 15 16   BUN 36* 45* 36* 37* 31*   CREATININE 2.65* 2.63* 2.61* 2.45* 2.65*   EGFR 26* 27* 27*  --   --    GLUCOSE 187* 151* 173* 108* 98     Recent Labs     11/27/24  0947 05/09/24  0758 02/09/24  0908 06/14/23  1119 05/31/22  0719 03/08/22  0800 09/15/20  1508 02/28/20  0746 12/20/19  0812   ALBUMIN 3.6 4.1 4.1  --  4.1 4.1   < > 4.2 4.0   ALKPHOS 52  --   --   --  32* 32*  --  37 31*   ALT 24  --   --  21 24 19  --  21 19   AST 24  --   --   --  25 19  --  19 18   BILITOT 0.7  --   --   --  0.6 0.4  --  0.5 0.4    < > = values in this interval not displayed.     CBC:  Recent " "Labs     05/09/24  0758 02/09/24  0908 05/31/22  0719 03/08/22  0800 12/28/21  0858   WBC 5.8 5.4 6.5 6.8 6.6   HGB 13.0* 13.0* 13.0* 13.2* 13.4*   HCT 41.1 41.4 40.7* 41.5 43.2    253 263 351 264   MCV 93 93 95 94 95     COAG: No results for input(s): \"INR\", \"DDIMERVTE\" in the last 85288 hours.  HEME/ENDO:  Recent Labs     11/27/24  0947 06/24/24  1049 05/09/24  0758 03/21/24  1038 02/09/24  0908 12/21/23  1335 06/14/23  1119 05/31/22  0719 03/08/22  0800 12/28/21  0858   FERRITIN 98  --  62  --  53  --   --   --   --  79   IRONSAT 7*  --  23*  --  20*  --   --   --   --  20*   TSH 4.41*  --   --   --   --   --  1.84 2.15 1.94 2.73   HGBA1C  --  6.8*  --  6.5  --  6.7* 6.5* 6.4* 9.0* 9.4*      CARDIAC: No results for input(s): \"LDH\", \"CKMB\", \"TROPHS\", \"BNP\" in the last 82829 hours.    No lab exists for component: \"CK\", \"CKMBP\"  Recent Labs     11/27/24  0947 05/09/24  0758 05/31/22  0719 03/08/22  0800 02/28/20  0746   CHOL 107 169 137 150 230*   LDLF  --   --  46 61 -   HDL 40.7 49.1 43.1 46.6 48.1   TRIG 136 294* 238* 213* 494*       Last Cardiology Tests:  ECG:      Echo:  Echo Results:  Transthoracic Echo (TTE) Complete 02/16/2024    [facility], [address] [place] [address]  Tel [phone] and Fax [phone]    TRANSTHORACIC ECHOCARDIOGRAM REPORT      Patient Name:      MARYSOL SHAYNA Smart Physician:    [id number] John Saldana MD  Study Date:        2/16/2024            Ordering Provider:    [id number] MARTINA HAWKINS  MRN/PID:           [id number]             Fellow:  Accession#:        [id number]         Nurse:  Date of Birth/Age: 1960 / 63      Sonographer:          Jack dumont                                      RDCS  Gender:            M                    Additional Staff:  Height:            182.88 cm            Admit Date:  Weight:            143.79 kg            Admission Status:     Outpatient  BSA / BMI:         2.59 " m2 / 42.99      Encounter#:           [id number]  kg/m2  Department Location:  [facility]  Stress Lab  Blood Pressure: 139 /57 mmHg    Study Type:    TRANSTHORACIC ECHO (TTE) COMPLETE  Diagnosis/ICD: Atherosclerotic heart disease of native coronary artery without  angina pectoris-I25.10; Congenital insufficiency of aortic  valve-Q23.1  Indication:    CAD, Bicuspid AV  CPT Code:      Echo Complete w Full Doppler-[id number]    Patient History:  Pertinent History: DM, HTN, PCI x3, MI (2009), Carotid atherosclerosis, AS,  Murmur, LE edema.    Study Detail: The following Echo studies were performed: 2D, M-Mode, Doppler and  color flow.      PHYSICIAN INTERPRETATION:  Left Ventricle: The left ventricular systolic function is normal, with an estimated ejection fraction of 55-60%. There are no regional wall motion abnormalities. The left ventricular cavity size is normal. Spectral Doppler shows an impaired relaxation pattern of left ventricular diastolic filling.  Left Atrium: The left atrium is normal in size.  Right Ventricle: The right ventricle is normal in size. There is normal right ventricular global systolic function.  Right Atrium: The right atrium is normal in size.  Aortic Valve: The aortic valve appears abnormal. There is moderate aortic valve cusp calcification. Fusion between the right and left coronary cusps. There is moderate aortic valve thickening. There is evidence of moderate aortic valve stenosis.  The aortic valve dimensionless index is 0.33. There is mild aortic valve regurgitation. The peak instantaneous gradient of the aortic valve is 39.3 mmHg. The mean gradient of the aortic valve is 23.6 mmHg.  Mitral Valve: The mitral valve is mildly thickened. There is mild mitral annular calcification. There is trace mitral valve regurgitation.  Tricuspid Valve: The tricuspid valve is structurally normal. There is trace tricuspid regurgitation. The right ventricular systolic pressure is unable to be  estimated.  Pulmonic Valve: The pulmonic valve is not well visualized. There is physiologic pulmonic valve regurgitation.  Pericardium: There is a trivial pericardial effusion.  Aorta: The aortic root is normal. There is mild dilatation of the ascending aorta.  Pulmonary Artery: The tricuspid regurgitant velocity is 2.86 m/s, and with an estimated right atrial pressure of 8 mmHg, the estimated pulmonary artery pressure is mildly elevated with the RVSP at 40.7 mmHg.  Systemic Veins: The inferior vena cava appears to be of normal size. There is less than 50% IVC collapse with inspiration.  In comparison to the previous echocardiogram(s): Compared with study from 10/28/2022, no significant change.      CONCLUSIONS:  1. Left ventricular systolic function is normal with a 55-60% estimated ejection fraction.  2. Spectral Doppler shows an impaired relaxation pattern of left ventricular diastolic filling.  3. Aortic valve appears abnormal.  4. Moderate aortic valve stenosis.  5. There is moderate aortic valve cusp calcification.  6. Mild aortic valve regurgitation.    QUANTITATIVE DATA SUMMARY:  2D MEASUREMENTS:  Normal Ranges:  LAs:           4.24 cm    (2.7-4.0cm)  RVIDd:         2.68 cm    (0.9-3.6cm)  IVSd:          1.25 cm    (0.6-1.1cm)  LVPWd:         1.36 cm    (0.6-1.1cm)  LVIDd:         5.08 cm    (3.9-5.9cm)  LVIDs:         3.43 cm  LV Mass Index: 104.1 g/m2  LV % FS        32.5 %    LA VOLUME:  Normal Ranges:  LA Vol A4C:        70.8 ml    (22+/-6mL/m2)  LA Vol A2C:        81.7 ml  LA Vol BP:         77.4 ml  LA Vol Index A4C:  27.3 ml/m2  LA Vol Index A2C:  31.5 ml/m2  LA Vol Index BP:   29.9 ml/m2  LA Area A4C:       21.4 cm2  LA Area A2C:       23.4 cm2  LA Major Axis A4C: 5.5 cm  LA Major Axis A2C: 5.7 cm  LA Vol A4C:        66.9 ml  LA Vol A2C:        77.6 ml    RA VOLUME BY A/L METHOD:  Normal Ranges:  RA Area A4C: 15.1 cm2    M-MODE MEASUREMENTS:  Normal Ranges:  Ao Root: 3.30 cm (2.0-3.7cm)    AORTA  MEASUREMENTS:  Normal Ranges:  Ao Sinus, d: 3.30 cm (2.1-3.5cm)  Ao STJ, d:   2.90 cm (1.7-3.4cm)  Asc Ao, d:   3.80 cm (2.1-3.4cm)  Ao Arch:     2.40 cm (2.0-3.6cm)    LV SYSTOLIC FUNCTION BY 2D PLANIMETRY (MOD):  Normal Ranges:  EF-A4C View: 53.4 % (>=55%)  EF-A2C View: 63.3 %  EF-Biplane:  56.9 %    LV DIASTOLIC FUNCTION:  Normal Ranges:  MV Peak E:        0.43 m/s   (0.7-1.2 m/s)  MV Peak A:        0.60 m/s   (0.42-0.7 m/s)  E/A Ratio:        0.72       (1.0-2.2)  MV e'             0.05 m/s   (>8.0)  MV lateral e'     0.05 m/s  MV medial e'      0.04 m/s  E/e' Ratio:       8.66       (<8.0)  PulmV Sys Amaury:    30.12 cm/s  PulmV Heath Amaury:   31.45 cm/s  PulmV S/D Amaury:    0.96  PulmV A Revs Amaury: 31.45 cm/s    MITRAL VALVE:  Normal Ranges:  MV DT: 367 msec (150-240msec)    AORTIC VALVE:  Normal Ranges:  AoV Vmax:                3.14 m/s  (<=1.7m/s)  AoV Peak P.3 mmHg (<20mmHg)  AoV Mean P.6 mmHg (1.7-11.5mmHg)  LVOT Max Amaury:            0.96 m/s  (<=1.1m/s)  AoV VTI:                 70.86 cm  (18-25cm)  LVOT VTI:                23.23 cm  LVOT Diameter:           2.07 cm   (1.8-2.4cm)  AoV Area, VTI:           1.11 cm2  (2.5-5.5cm2)  AoV Area,Vmax:           1.03 cm2  (2.5-4.5cm2)  AoV Dimensionless Index: 0.33      RIGHT VENTRICLE:  RV Basal 3.90 cm  RV Mid   2.70 cm  RV Major 8.1 cm  TAPSE:   22.4 mm  RV s'    0.15 m/s    TRICUSPID VALVE/RVSP:  Normal Ranges:  Peak TR Velocity: 2.86 m/s  RV Syst Pressure: 40.7 mmHg (< 30mmHg)  IVC Diam:         1.90 cm    PULMONIC VALVE:  Normal Ranges:  PV Max Amaury: 0.9 m/s  (0.6-0.9m/s)  PV Max PG:  3.1 mmHg    Pulmonary Veins:  PulmV A Revs Amaury: 31.45 cm/s  PulmV Heath Amaury:   31.45 cm/s  PulmV S/D Amaury:    0.96  PulmV Sys Amaury:    30.12 cm/s    AORTA:  Asc Ao Diam 3.81 cm      [id number] John Saldana MD  Electronically signed on 2024 at 4:17:32 PM        ** Final **       Cath:      Stress Test:  Stress Results:  No results found for this or any  previous visit from the past 365 days.         Cardiac Imaging:  ECG 12 lead (Clinic Performed)  Sinus rhythm with occasional Premature ventricular complexes  Septal infarct (cited on or before 16-AUG-2023)  Abnormal ECG  When compared with ECG of 16-AUG-2023 09:01,  Premature ventricular complexes are now Present  Confirmed by Woody Ryan ([id number]) on 9/20/2024 4:05:32 PM        Assessment/Plan       63-year-old male with a history of ischemic heart disease prior coronary stents as noted above, diabetes mellitus with stage IV CKD who presented to an hospital in [place] after he had been on a trip to [facility] for [employer] race, I believe his family member 1, he subsequently came in with a suspected non-STEMI found to have 80% LAD stenosis that was stented with an [place] 30 mm x 3.0 mm drug-eluting stent, he was told to stop taking several of his meds asked for renewal of several others which has been placed.  He seems euvolemic so I told him to just take his torsemide as needed.  I reached out to his primary care doctor with regard to the gabapentin which she had been taking for anosmia and dysgeusia.  He feels poorly is eating poorly I wonder if he may have a little bit of withdrawal from the gabapentin.  I have also reached out to his endocrinologist because of hypoglycemia he is already been de-escalating some of his insulin dosing.  I am hesitant to uptitrate any of his GDMT, I suggested he see us again in 6 weeks to repeat a limited echo if his reduced EF noted at his hospitalization (had dropped to 35% from normal) will likely increase carvedilol and afterload reduction.      Orders:  No orders of the defined types were placed in this encounter.     Followup Appts:  Future Appointments   Date Time Provider Department Center   1/8/2025  4:40 PM Antony Rodríguez MD [id number] [place]   1/13/2025  9:50 AM MIN ECHO [id number] [facility]   1/14/2025 12:00 PM Supa Zuñiga MD [id number]  [facility]   3/14/2025  9:40 AM Woody Ryan MD [id number] [facility]           ____________________________________________________________  Woody Ryan MD    Senior Attending Physician  [facility]

## 2024-12-09 RX ORDER — EMPAGLIFLOZIN 10 MG/1
10 TABLET, FILM COATED ORAL DAILY
Qty: 90 TABLET | Refills: 3 | Status: SHIPPED | OUTPATIENT
Start: 2024-12-09

## 2025-01-08 ENCOUNTER — APPOINTMENT (OUTPATIENT)
Dept: NEPHROLOGY | Facility: CLINIC | Age: 65
End: 2025-01-08
Payer: COMMERCIAL

## 2025-01-08 VITALS
WEIGHT: 310.85 LBS | HEART RATE: 89 BPM | BODY MASS INDEX: 42.1 KG/M2 | SYSTOLIC BLOOD PRESSURE: 122 MMHG | HEIGHT: 72 IN | OXYGEN SATURATION: 96 % | DIASTOLIC BLOOD PRESSURE: 79 MMHG | TEMPERATURE: 97.1 F

## 2025-01-08 DIAGNOSIS — R60.0 LOCALIZED EDEMA: ICD-10-CM

## 2025-01-08 DIAGNOSIS — N06.0 ISOLATED PROTEINURIA WITH MINOR GLOMERULAR ABNORMALITY: ICD-10-CM

## 2025-01-08 DIAGNOSIS — I10 PRIMARY HYPERTENSION: ICD-10-CM

## 2025-01-08 DIAGNOSIS — N18.4 STAGE 4 CHRONIC KIDNEY DISEASE (MULTI): Primary | ICD-10-CM

## 2025-01-08 DIAGNOSIS — N18.4 CHRONIC RENAL IMPAIRMENT, STAGE 4 (SEVERE) (MULTI): ICD-10-CM

## 2025-01-08 DIAGNOSIS — D50.8 OTHER IRON DEFICIENCY ANEMIA: ICD-10-CM

## 2025-01-08 PROCEDURE — 3078F DIAST BP <80 MM HG: CPT | Performed by: INTERNAL MEDICINE

## 2025-01-08 PROCEDURE — 99215 OFFICE O/P EST HI 40 MIN: CPT | Performed by: INTERNAL MEDICINE

## 2025-01-08 PROCEDURE — 3008F BODY MASS INDEX DOCD: CPT | Performed by: INTERNAL MEDICINE

## 2025-01-08 PROCEDURE — 3074F SYST BP LT 130 MM HG: CPT | Performed by: INTERNAL MEDICINE

## 2025-01-08 RX ORDER — ERGOCALCIFEROL 1.25 MG/1
50000 CAPSULE ORAL
Qty: 4 CAPSULE | Refills: 11 | Status: SHIPPED | OUTPATIENT
Start: 2025-01-12 | End: 2026-01-12

## 2025-01-08 NOTE — PATIENT INSTRUCTIONS
Dear MARYSOL   It was nice seeing you in the nephrology clinic today     Today we discussed the following:     # Chronic kidney disease (CKD) sure-stage 4 ~your baseline 25-30%-relatively stable   - CKD is due to hypertension and diabetes and remote Advil use     # Hypertension -as accepted today.  Currently you take torsemide 20 mg, hydralazine 75 mg 3 times daily, amlodipine 10 mg, carvedilol        # Diabetes-controlled recently.  Resume Jardiance 10 mg    # Worsening protein leak in the urine-  -Continue losartan and Jardiance for kidney protection  -Limit animal-based protein 3 times weekly instead of daily     # Vitamin D deficiency-starting weekly supplement      - follow up in 6 months  with repeat blood work and urine analysis before visit        Please call our office if you have any question  Thank you for coming to see me today     Antony Rodríguez MD, MS, MICHELLE ZAMUDIO  Clinical  - [employer]  Nephrologist - [facility]
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
## 2025-01-13 ENCOUNTER — APPOINTMENT (OUTPATIENT)
Dept: CARDIOLOGY | Facility: CLINIC | Age: 65
End: 2025-01-13
Payer: COMMERCIAL

## 2025-01-14 ENCOUNTER — LAB (OUTPATIENT)
Dept: LAB | Facility: LAB | Age: 65
End: 2025-01-14
Payer: COMMERCIAL

## 2025-01-14 ENCOUNTER — APPOINTMENT (OUTPATIENT)
Dept: ENDOCRINOLOGY | Facility: CLINIC | Age: 65
End: 2025-01-14
Payer: COMMERCIAL

## 2025-01-14 VITALS — SYSTOLIC BLOOD PRESSURE: 96 MMHG | WEIGHT: 313 LBS | DIASTOLIC BLOOD PRESSURE: 64 MMHG | BODY MASS INDEX: 43.05 KG/M2

## 2025-01-14 DIAGNOSIS — I10 PRIMARY HYPERTENSION: ICD-10-CM

## 2025-01-14 DIAGNOSIS — Z79.4 TYPE 2 DIABETES MELLITUS WITHOUT COMPLICATION, WITH LONG-TERM CURRENT USE OF INSULIN (MULTI): ICD-10-CM

## 2025-01-14 DIAGNOSIS — E11.9 TYPE 2 DIABETES MELLITUS WITHOUT COMPLICATION, WITH LONG-TERM CURRENT USE OF INSULIN (MULTI): ICD-10-CM

## 2025-01-14 DIAGNOSIS — E04.1 THYROID NODULE: ICD-10-CM

## 2025-01-14 DIAGNOSIS — Z79.4 TYPE 2 DIABETES MELLITUS WITHOUT COMPLICATION, WITH LONG-TERM CURRENT USE OF INSULIN (MULTI): Primary | ICD-10-CM

## 2025-01-14 DIAGNOSIS — E11.9 TYPE 2 DIABETES MELLITUS WITHOUT COMPLICATION, WITH LONG-TERM CURRENT USE OF INSULIN (MULTI): Primary | ICD-10-CM

## 2025-01-14 DIAGNOSIS — E78.5 HYPERLIPIDEMIA, UNSPECIFIED HYPERLIPIDEMIA TYPE: ICD-10-CM

## 2025-01-14 LAB
EST. AVERAGE GLUCOSE BLD GHB EST-MCNC: 137 MG/DL
HBA1C MFR BLD: 6.4 %
THYROPEROXIDASE AB SERPL-ACNC: <28 IU/ML
TSH SERPL-ACNC: 3.16 MIU/L (ref 0.44–3.98)

## 2025-01-14 PROCEDURE — 3078F DIAST BP <80 MM HG: CPT | Performed by: INTERNAL MEDICINE

## 2025-01-14 PROCEDURE — 84443 ASSAY THYROID STIM HORMONE: CPT

## 2025-01-14 PROCEDURE — 99214 OFFICE O/P EST MOD 30 MIN: CPT | Performed by: INTERNAL MEDICINE

## 2025-01-14 PROCEDURE — 83036 HEMOGLOBIN GLYCOSYLATED A1C: CPT

## 2025-01-14 PROCEDURE — 3074F SYST BP LT 130 MM HG: CPT | Performed by: INTERNAL MEDICINE

## 2025-01-14 PROCEDURE — 86376 MICROSOMAL ANTIBODY EACH: CPT

## 2025-01-14 NOTE — PROGRESS NOTES
Patient ID: Nicolas Shultz is a 64 y.o. male who presents for Follow-up.  HPI    The patient comes in for follow up.    He was last seen June 24, 2024.    Type 2 diabetes hypertension hyperlipidemia renal insufficiency thyroid nodules.    He continues on Humalog 40 units at breakfast 40-50 lunch 40-60 at dinner Semglee 40 units and Trulicity 4.5 mg Jardiance 10 mg through his nephrologist.    He continues using the freestyle sha 2.    His 90-day average 134 time in target 79%.  Lows after dinner.    Since last being seen he had an MRI in November.  He had a stent placed.    He is slightly abnormal TSH immediately after the MI and was put on levothyroxine 25 mcg.    He had a carotid ultrasound which revealed a thyroid nodule.    In April 2022 he had 3 TI-RADS 1 nodules on the right and 1 TI-RADS 1 nodule on the left with a TI-RADS three 1.7 x 1.5 x 1.4 cm nodule in the isthmus.    In June 2023 we repeated an ultrasound which was stable and plan to repeat ultrasounds June 2025 and 2027.    He notes no change within his thyroid.    Physically he has no other complaints.    ROS  Comprehensive review of systems is negative.    Objective   Physical Exam  Visit Vitals  BP 96/64      Vitals:    01/14/25 1148   Weight: 142 kg (313 lb)      Body mass index is 43.05 kg/m².      Weight 313 down 8 pounds    ENT normal. No adenopathy  Fundi poorly visualized  Thyroid palpable and normal. No nodules  Chest clear to auscultation  Heart sounds are normal  Abdomen nontender. Bowel sounds normal. No organomegaly  Feet are okay  Decreased vibration and monofilament sensation normal pulses, no lesions    Current Outpatient Medications   Medication Sig Dispense Refill    amLODIPine (Norvasc) 10 mg tablet Take 1 tablet (10 mg) by mouth once daily. 90 tablet 0    aspirin 81 mg EC tablet Take 1 tablet (81 mg) by mouth once daily.      atorvastatin (Lipitor) 80 mg tablet Take 1 tablet (80 mg) by mouth once daily. 90 tablet 3    B  complex-vitamin C-folic acid (Josy-Jesus) 0.8 mg tablet Take 1 tablet by mouth once daily. (Patient not taking: Reported on 1/8/2025)      carvedilol (Coreg) 3.125 mg tablet Take 1 tablet (3.125 mg) by mouth 2 times a day. (Patient not taking: Reported on 1/8/2025)      carvedilol (Coreg) 3.125 mg tablet Take 1 tablet (3.125 mg) by mouth 2 times daily (morning and late afternoon). 180 tablet 3    cholecalciferol (Vitamin D-3) 25 MCG (1000 UT) capsule Take 1 capsule (25 mcg) by mouth once daily. (Patient not taking: Reported on 1/8/2025)      clopidogrel (Plavix) 75 mg tablet Take 1 tablet (75 mg) by mouth once daily.      dulaglutide (Trulicity) 4.5 mg/0.5 mL pen injector Inject 4.5 mg under the skin 1 (one) time per week. 12 Pen 3    empagliflozin (Jardiance) 10 mg Take 1 tablet (10 mg) by mouth once daily. 90 tablet 3    ergocalciferol (Vitamin D-2) 1.25 MG (32061 UT) capsule Take 1 capsule (50,000 Units) by mouth 1 (one) time per week. 4 capsule 11    fenofibrate (Triglide) 160 mg tablet Take 1 tablet (160 mg) by mouth once daily. (Patient not taking: Reported on 1/8/2025)      FreeStyle Josh 2 Sensor kit Use as instructed 6 each 0    FreeStyle Josh 2 Sensor kit use as directed 2 each 11    FreeStyle Josh 3 [place] misc Use as instructed 1 each 0    FreeStyle Josh 3 Sensor device Change every 14 days 6 each 3    FreeStyle Lite Strips strip once daily.      gabapentin (Neurontin) 300 mg capsule Take 1 capsule (300 mg) by mouth 3 times a day.      hydrALAZINE (Apresoline) 25 mg tablet Take 3 tablets (75 mg) by mouth 3 times a day.      icosapent ethyL (Vascepa) 1 gram capsule Take by mouth 2 times a day with meals. (Patient not taking: Reported on 1/8/2025)      insulin glargine-yfgn (Semglee,insulin glarg-yfgn,Pen) 100 unit/mL (3 mL) Pen Inject 70 Units under the skin once daily at bedtime. 90 mL 3    insulin lispro (HumaLOG KwikPen Insulin) 100 unit/mL injection Inject 50 Units under the skin 3 times daily  (morning, midday, late afternoon). 150 mL 3    isosorbide mononitrate ER (Imdur) 30 mg 24 hr tablet Take 1 tablet (30 mg) by mouth once daily. (Patient not taking: Reported on 1/8/2025)      isosorbide mononitrate ER (Imdur) 30 mg 24 hr tablet Take 1 tablet (30 mg) by mouth once daily. Do not crush or chew. 90 tablet 3    levothyroxine (Synthroid, Levoxyl) 25 mcg tablet Take 1 tablet (25 mcg) by mouth once daily. (Patient not taking: Reported on 1/8/2025)      losartan (Cozaar) 50 mg tablet Take 1 tablet (50 mg) by mouth once daily. 90 tablet 3    metoprolol tartrate (Lopressor) 50 mg tablet Take 1 tablet (50 mg) by mouth 2 times a day. 180 tablet 0    rosuvastatin (Crestor) 40 mg tablet Take 1 tablet (40 mg) by mouth once daily at bedtime. (Patient not taking: Reported on 1/8/2025)      SITagliptin phosphate (Januvia) 50 mg tablet Take 1 tablet (50 mg) by mouth once daily. (Patient not taking: Reported on 1/8/2025)      torsemide (Demadex) 20 mg tablet Take 1 tablet (20 mg) by mouth once daily.       No current facility-administered medications for this visit.       Assessment/Plan     1.  Type 2 diabetes  2.  Hypertension  3.  Thyroid nodules  4.  Renal insufficiency    He will adjust back the insulin at dinner.    Consider switching from Trulicity to Ozempic or Mounjaro.    Will check TSH thyroperoxidase antibody and hemoglobin A1c today.    He will follow-up with me in 3 months at which point we will repeat ultrasound sooner as needed.

## 2025-01-17 ENCOUNTER — APPOINTMENT (OUTPATIENT)
Dept: CARDIOLOGY | Facility: CLINIC | Age: 65
End: 2025-01-17
Payer: COMMERCIAL

## 2025-01-17 ENCOUNTER — OFFICE VISIT (OUTPATIENT)
Dept: CARDIOLOGY | Facility: CLINIC | Age: 65
End: 2025-01-17
Payer: COMMERCIAL

## 2025-01-17 ENCOUNTER — HOSPITAL ENCOUNTER (OUTPATIENT)
Dept: CARDIOLOGY | Facility: CLINIC | Age: 65
Discharge: HOME | End: 2025-01-17
Payer: COMMERCIAL

## 2025-01-17 VITALS
DIASTOLIC BLOOD PRESSURE: 73 MMHG | SYSTOLIC BLOOD PRESSURE: 152 MMHG | WEIGHT: 305.2 LBS | BODY MASS INDEX: 42.73 KG/M2 | HEART RATE: 82 BPM | HEIGHT: 71 IN | OXYGEN SATURATION: 95 %

## 2025-01-17 DIAGNOSIS — I25.10 ATHEROSCLEROSIS OF NATIVE CORONARY ARTERY WITHOUT ANGINA PECTORIS, UNSPECIFIED WHETHER NATIVE OR TRANSPLANTED HEART: Primary | ICD-10-CM

## 2025-01-17 DIAGNOSIS — I25.5 ISCHEMIC CARDIOMYOPATHY: ICD-10-CM

## 2025-01-17 DIAGNOSIS — I35.0 AORTIC STENOSIS, MILD: ICD-10-CM

## 2025-01-17 DIAGNOSIS — R06.00 DYSPNEA, UNSPECIFIED: ICD-10-CM

## 2025-01-17 LAB
AORTIC VALVE MEAN GRADIENT: 33 MMHG
AORTIC VALVE PEAK VELOCITY: 3.59 M/S
AV PEAK GRADIENT: 52 MMHG
AVA (PEAK VEL): 1.13 CM2
AVA (VTI): 1.18 CM2
EJECTION FRACTION APICAL 4 CHAMBER: 39.8
EJECTION FRACTION: 44 %
LEFT ATRIUM VOLUME AREA LENGTH INDEX BSA: 40.7 ML/M2
LEFT VENTRICLE INTERNAL DIMENSION DIASTOLE: 6.02 CM (ref 3.5–6)
LEFT VENTRICULAR OUTFLOW TRACT DIAMETER: 2.35 CM
MITRAL VALVE E/A RATIO: 1.57
RIGHT VENTRICLE FREE WALL PEAK S': 11 CM/S
TRICUSPID ANNULAR PLANE SYSTOLIC EXCURSION: 1.4 CM

## 2025-01-17 PROCEDURE — 3044F HG A1C LEVEL LT 7.0%: CPT | Performed by: INTERNAL MEDICINE

## 2025-01-17 PROCEDURE — 3008F BODY MASS INDEX DOCD: CPT | Performed by: INTERNAL MEDICINE

## 2025-01-17 PROCEDURE — 3077F SYST BP >= 140 MM HG: CPT | Performed by: INTERNAL MEDICINE

## 2025-01-17 PROCEDURE — 93308 TTE F-UP OR LMTD: CPT | Performed by: INTERNAL MEDICINE

## 2025-01-17 PROCEDURE — 99215 OFFICE O/P EST HI 40 MIN: CPT | Performed by: INTERNAL MEDICINE

## 2025-01-17 PROCEDURE — 4010F ACE/ARB THERAPY RXD/TAKEN: CPT | Performed by: INTERNAL MEDICINE

## 2025-01-17 PROCEDURE — 99215 OFFICE O/P EST HI 40 MIN: CPT | Mod: 25 | Performed by: INTERNAL MEDICINE

## 2025-01-17 PROCEDURE — 93321 DOPPLER ECHO F-UP/LMTD STD: CPT

## 2025-01-17 PROCEDURE — 93321 DOPPLER ECHO F-UP/LMTD STD: CPT | Performed by: INTERNAL MEDICINE

## 2025-01-17 PROCEDURE — 93325 DOPPLER ECHO COLOR FLOW MAPG: CPT | Performed by: INTERNAL MEDICINE

## 2025-01-17 PROCEDURE — 1036F TOBACCO NON-USER: CPT | Performed by: INTERNAL MEDICINE

## 2025-01-17 PROCEDURE — 3078F DIAST BP <80 MM HG: CPT | Performed by: INTERNAL MEDICINE

## 2025-01-17 ASSESSMENT — ENCOUNTER SYMPTOMS
DEPRESSION: 0
OCCASIONAL FEELINGS OF UNSTEADINESS: 0
LOSS OF SENSATION IN FEET: 0

## 2025-01-17 ASSESSMENT — PAIN SCALES - GENERAL: PAINLEVEL_OUTOF10: 0-NO PAIN

## 2025-01-17 NOTE — PROGRESS NOTES
Primary Care Physician: Alejandro Beckford MD  Date of Visit: 01/17/2025  9:20 AM EST  Location of visit: [facility] [address]     Chief Complaint:   Chief Complaint   Patient presents with    Follow-up        HPI / Summary:   Nicolas Shultz is a 64 y.o. male presents for followup.     Last seen in December.  [place] and was noted to have a decline in his EF.  Believe that triggered an ischemic evaluation he subsequently had another stent to the LAD    He had a history of LAD stent in 2009 and circumflex stent 2011.  Bicuspid aortic valve disease with moderate aortic stenosis.  DM2, HTN, CKD 4 hypertension.  He had an echo today to assess for improvement in LV function in light of his recently reduced EF [place] post revascularization  [facility].  Admit with ? ADHF. Worsening JENNINGS.  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    Anterior scleritis of right eye    Aortic stenosis     CAD S/P percutaneous coronary angioplasty     CKD (chronic kidney disease)     Diabetes mellitus (Multi)     Glaucoma secondary to other eye disorders, unspecified eye, stage unspecified     NVG (neovascular glaucoma)    HTN (hypertension)     Hyperlipidemia     Hypothyroidism     Male erectile dysfunction, unspecified     Obstructive sleep apnea (adult) (pediatric)     Ocular hypertension, unspecified eye     Elevated IOP    Old myocardial infarction           Past Surgical History:    Procedure Laterality Date    COLONOSCOPY      CORONARY ANGIOPLASTY WITH STENT PLACEMENT  2009    LAD    CORONARY ANGIOPLASTY WITH STENT PLACEMENT      FEMUR FRACTURE SURGERY      LUNG SURGERY      RETINAL DETACHMENT SURGERY            Social History:   reports that he has never smoked. He has never used smokeless tobacco. He reports that he does not currently use alcohol. He reports that he does not use drugs.      Allergies:  Allergies   Allergen Reactions    Penicillins Hives, Rash and Shortness of breath       Outpatient Medications:  Current Outpatient Medications   Medication Instructions    amLODIPine (NORVASC) 10 mg, oral, Daily    aspirin 81 mg, Daily    atorvastatin (LIPITOR) 80 mg, oral, Daily    B complex-vitamin C-folic acid (Josy-Jesus) 0.8 mg tablet 0.8 mg, Daily    carvedilol (COREG) 3.125 mg, 2 times daily    carvedilol (COREG) 3.125 mg, oral, 2 times daily (morning and late afternoon)    cholecalciferol (VITAMIN D-3) 25 mcg, Daily    clopidogrel (PLAVIX) 75 mg, Daily    empagliflozin (JARDIANCE) 10 mg, oral, Daily    ergocalciferol (VITAMIN D-2) 50,000 Units, oral, Once Weekly    fenofibrate (Triglide) 160 mg tablet 1 tablet, Daily    FreeStyle Josh 2 Sensor kit Use as instructed    FreeStyle Josh 2 Sensor kit use as directed    FreeStyle Josh 3 [place] misc Use as instructed    FreeStyle Josh 3 Sensor device Change every 14 days    FreeStyle Lite Strips strip Daily    gabapentin (NEURONTIN) 300 mg, 3 times daily    hydrALAZINE (APRESOLINE) 75 mg, 3 times daily    icosapent ethyL (Vascepa) 1 gram capsule 2 times daily (morning and late afternoon)    insulin glargine-yfgn (SEMGLEE(INSULIN GLARG-YFGN)PEN) 70 Units, subcutaneous, Nightly    insulin lispro (HUMALOG KWIKPEN INSULIN) 50 Units, subcutaneous, 3 times daily (morning, midday, late afternoon)    isosorbide mononitrate ER (IMDUR) 30 mg, Daily    isosorbide mononitrate ER (IMDUR) 30 mg, oral, Daily, Do not crush or chew.    levothyroxine  "(SYNTHROID, LEVOXYL) 25 mcg, Daily    losartan (COZAAR) 50 mg, oral, Daily    metoprolol tartrate (LOPRESSOR) 50 mg, oral, 2 times daily    rosuvastatin (CRESTOR) 40 mg, Nightly    SITagliptin phosphate (JANUVIA) 50 mg, Daily    torsemide (DEMADEX) 20 mg, Daily    Trulicity 4.5 mg, subcutaneous, Once Weekly       ROS     Physical Exam:  Vitals:    01/17/25 0929   BP: 152/73   BP Location: Right arm   Patient Position: Sitting   BP Cuff Size: Large adult   Pulse: 82   SpO2: 95%   Weight: 138 kg (305 lb 3.2 oz)   Height: 1.803 m (5' 11\")     Wt Readings from Last 5 Encounters:   01/17/25 138 kg (305 lb 3.2 oz)   01/14/25 142 kg (313 lb)   01/08/25 141 kg (310 lb 13.6 oz)   12/06/24 138 kg (304 lb 4.8 oz)   09/13/24 145 kg (320 lb 6 oz)     Body mass index is 42.57 kg/m².   Physical Exam   No acute distress.  Neck veins were distended.  Systolic ejection murmur 5 out of 6 difficult to hear diastolic murmur.  Could not tell gallop.  Clear lungs.  Soft abdomen.  Trace ankle edema  Last Labs:  CMP:  Recent Labs     11/27/24  0947 05/09/24  0758 02/09/24  0908 06/14/23  1119 05/31/22  0719   * 142 142 142 141   K 5.0 4.4 4.9 4.3 4.7   CL 97* 105 103 102 106   CO2 24 25 28 29 24   ANIONGAP 18 16 16 15 16   BUN 36* 45* 36* 37* 31*   CREATININE 2.65* 2.63* 2.61* 2.45* 2.65*   EGFR 26* 27* 27*  --   --    GLUCOSE 187* 151* 173* 108* 98     Recent Labs     11/27/24  0947 05/09/24  0758 02/09/24  0908 06/14/23  1119 05/31/22  0719 03/08/22  0800 09/15/20  1508 02/28/20  0746 12/20/19  0812   ALBUMIN 3.6 4.1 4.1  --  4.1 4.1   < > 4.2 4.0   ALKPHOS 52  --   --   --  32* 32*  --  37 31*   ALT 24  --   --  21 24 19  --  21 19   AST 24  --   --   --  25 19  --  19 18   BILITOT 0.7  --   --   --  0.6 0.4  --  0.5 0.4    < > = values in this interval not displayed.     CBC:  Recent Labs     05/09/24  0758 02/09/24  0908 05/31/22  0719 03/08/22  0800 12/28/21  0858   WBC 5.8 5.4 6.5 6.8 6.6   HGB 13.0* 13.0* 13.0* 13.2* 13.4* " "  HCT 41.1 41.4 40.7* 41.5 43.2    253 263 351 264   MCV 93 93 95 94 95     COAG: No results for input(s): \"INR\", \"DDIMERVTE\" in the last 25805 hours.  HEME/ENDO:  Recent Labs     01/14/25  1211 11/27/24  0947 06/24/24  1049 05/09/24  0758 03/21/24  1038 02/09/24  0908 12/21/23  1335 06/14/23  1119 05/31/22  0719 03/08/22  0800 12/28/21  0858   FERRITIN  --  98  --  62  --  53  --   --   --   --  79   IRONSAT  --  7*  --  23*  --  20*  --   --   --   --  20*   TSH 3.16 4.41*  --   --   --   --   --  1.84 2.15   < > 2.73   HGBA1C 6.4*  --  6.8*  --  6.5  --  6.7* 6.5* 6.4*   < > 9.4*    < > = values in this interval not displayed.      CARDIAC: No results for input(s): \"LDH\", \"CKMB\", \"TROPHS\", \"BNP\" in the last 82723 hours.    No lab exists for component: \"CK\", \"CKMBP\"  Recent Labs     11/27/24  0947 05/09/24  0758 05/31/22  0719 03/08/22  0800 02/28/20  0746   CHOL 107 169 137 150 230*   LDLF  --   --  46 61 -   HDL 40.7 49.1 43.1 46.6 48.1   TRIG 136 294* 238* 213* 494*       Last Cardiology Tests:  ECG:      Echo:  Echo Results:  Transthoracic Echo (TTE) Complete 02/16/2024    [facility] at [facility], [address] [place] [address]  Tel [phone] and Fax [phone]    TRANSTHORACIC ECHOCARDIOGRAM REPORT      Patient Name:      MARYSOL Smart Physician:    [id number] John Saldana MD  Study Date:        2/16/2024            Ordering Provider:    [id number] MARTINA HAWKINS  MRN/PID:           [id number]             Fellow:  Accession#:        [id number]         Nurse:  Date of Birth/Age: 1960 / 63      Sonographer:          Jack dumont RDCS  Gender:            M                    Additional Staff:  Height:            182.88 cm            Admit Date:  Weight:            143.79 kg            Admission Status:     Outpatient  BSA / BMI:         2.59 m2 / 42.99      Encounter#:           " [id number]  kg/m2  Department Location:  [facility]  Stress Lab  Blood Pressure: 139 /57 mmHg    Study Type:    TRANSTHORACIC ECHO (TTE) COMPLETE  Diagnosis/ICD: Atherosclerotic heart disease of native coronary artery without  angina pectoris-I25.10; Congenital insufficiency of aortic  valve-Q23.1  Indication:    CAD, Bicuspid AV  CPT Code:      Echo Complete w Full Doppler-[id number]    Patient History:  Pertinent History: DM, HTN, PCI x3, MI (2009), Carotid atherosclerosis, AS,  Murmur, LE edema.    Study Detail: The following Echo studies were performed: 2D, M-Mode, Doppler and  color flow.      PHYSICIAN INTERPRETATION:  Left Ventricle: The left ventricular systolic function is normal, with an estimated ejection fraction of 55-60%. There are no regional wall motion abnormalities. The left ventricular cavity size is normal. Spectral Doppler shows an impaired relaxation pattern of left ventricular diastolic filling.  Left Atrium: The left atrium is normal in size.  Right Ventricle: The right ventricle is normal in size. There is normal right ventricular global systolic function.  Right Atrium: The right atrium is normal in size.  Aortic Valve: The aortic valve appears abnormal. There is moderate aortic valve cusp calcification. Fusion between the right and left coronary cusps. There is moderate aortic valve thickening. There is evidence of moderate aortic valve stenosis.  The aortic valve dimensionless index is 0.33. There is mild aortic valve regurgitation. The peak instantaneous gradient of the aortic valve is 39.3 mmHg. The mean gradient of the aortic valve is 23.6 mmHg.  Mitral Valve: The mitral valve is mildly thickened. There is mild mitral annular calcification. There is trace mitral valve regurgitation.  Tricuspid Valve: The tricuspid valve is structurally normal. There is trace tricuspid regurgitation. The right ventricular systolic pressure is unable to be estimated.  Pulmonic Valve: The pulmonic valve  is not well visualized. There is physiologic pulmonic valve regurgitation.  Pericardium: There is a trivial pericardial effusion.  Aorta: The aortic root is normal. There is mild dilatation of the ascending aorta.  Pulmonary Artery: The tricuspid regurgitant velocity is 2.86 m/s, and with an estimated right atrial pressure of 8 mmHg, the estimated pulmonary artery pressure is mildly elevated with the RVSP at 40.7 mmHg.  Systemic Veins: The inferior vena cava appears to be of normal size. There is less than 50% IVC collapse with inspiration.  In comparison to the previous echocardiogram(s): Compared with study from 10/28/2022, no significant change.      CONCLUSIONS:  1. Left ventricular systolic function is normal with a 55-60% estimated ejection fraction.  2. Spectral Doppler shows an impaired relaxation pattern of left ventricular diastolic filling.  3. Aortic valve appears abnormal.  4. Moderate aortic valve stenosis.  5. There is moderate aortic valve cusp calcification.  6. Mild aortic valve regurgitation.    QUANTITATIVE DATA SUMMARY:  2D MEASUREMENTS:  Normal Ranges:  LAs:           4.24 cm    (2.7-4.0cm)  RVIDd:         2.68 cm    (0.9-3.6cm)  IVSd:          1.25 cm    (0.6-1.1cm)  LVPWd:         1.36 cm    (0.6-1.1cm)  LVIDd:         5.08 cm    (3.9-5.9cm)  LVIDs:         3.43 cm  LV Mass Index: 104.1 g/m2  LV % FS        32.5 %    LA VOLUME:  Normal Ranges:  LA Vol A4C:        70.8 ml    (22+/-6mL/m2)  LA Vol A2C:        81.7 ml  LA Vol BP:         77.4 ml  LA Vol Index A4C:  27.3 ml/m2  LA Vol Index A2C:  31.5 ml/m2  LA Vol Index BP:   29.9 ml/m2  LA Area A4C:       21.4 cm2  LA Area A2C:       23.4 cm2  LA Major Axis A4C: 5.5 cm  LA Major Axis A2C: 5.7 cm  LA Vol A4C:        66.9 ml  LA Vol A2C:        77.6 ml    RA VOLUME BY A/L METHOD:  Normal Ranges:  RA Area A4C: 15.1 cm2    M-MODE MEASUREMENTS:  Normal Ranges:  Ao Root: 3.30 cm (2.0-3.7cm)    AORTA MEASUREMENTS:  Normal Ranges:  Ao Sinus, d: 3.30 cm  (2.1-3.5cm)  Ao STJ, d:   2.90 cm (1.7-3.4cm)  Asc Ao, d:   3.80 cm (2.1-3.4cm)  Ao Arch:     2.40 cm (2.0-3.6cm)    LV SYSTOLIC FUNCTION BY 2D PLANIMETRY (MOD):  Normal Ranges:  EF-A4C View: 53.4 % (>=55%)  EF-A2C View: 63.3 %  EF-Biplane:  56.9 %    LV DIASTOLIC FUNCTION:  Normal Ranges:  MV Peak E:        0.43 m/s   (0.7-1.2 m/s)  MV Peak A:        0.60 m/s   (0.42-0.7 m/s)  E/A Ratio:        0.72       (1.0-2.2)  MV e'             0.05 m/s   (>8.0)  MV lateral e'     0.05 m/s  MV medial e'      0.04 m/s  E/e' Ratio:       8.66       (<8.0)  PulmV Sys Amaury:    30.12 cm/s  PulmV Heath Amaury:   31.45 cm/s  PulmV S/D Amaury:    0.96  PulmV A Revs Amaury: 31.45 cm/s    MITRAL VALVE:  Normal Ranges:  MV DT: 367 msec (150-240msec)    AORTIC VALVE:  Normal Ranges:  AoV Vmax:                3.14 m/s  (<=1.7m/s)  AoV Peak P.3 mmHg (<20mmHg)  AoV Mean P.6 mmHg (1.7-11.5mmHg)  LVOT Max Amaury:            0.96 m/s  (<=1.1m/s)  AoV VTI:                 70.86 cm  (18-25cm)  LVOT VTI:                23.23 cm  LVOT Diameter:           2.07 cm   (1.8-2.4cm)  AoV Area, VTI:           1.11 cm2  (2.5-5.5cm2)  AoV Area,Vmax:           1.03 cm2  (2.5-4.5cm2)  AoV Dimensionless Index: 0.33      RIGHT VENTRICLE:  RV Basal 3.90 cm  RV Mid   2.70 cm  RV Major 8.1 cm  TAPSE:   22.4 mm  RV s'    0.15 m/s    TRICUSPID VALVE/RVSP:  Normal Ranges:  Peak TR Velocity: 2.86 m/s  RV Syst Pressure: 40.7 mmHg (< 30mmHg)  IVC Diam:         1.90 cm    PULMONIC VALVE:  Normal Ranges:  PV Max Amaury: 0.9 m/s  (0.6-0.9m/s)  PV Max PG:  3.1 mmHg    Pulmonary Veins:  PulmV A Revs Amaury: 31.45 cm/s  PulmV Heath Amaury:   31.45 cm/s  PulmV S/D Amaury:    0.96  PulmV Sys Amaury:    30.12 cm/s    AORTA:  Asc Ao Diam 3.81 cm      [id number] John Saldana MD  Electronically signed on 2024 at 4:17:32 PM        ** Final **       Cath:      Stress Test:  Stress Results:  No results found for this or any previous visit from the past 365 days.         Cardiac  Imaging:  ECG 12 lead (Clinic Performed)  Sinus rhythm with occasional Premature ventricular complexes  Septal infarct (cited on or before 16-AUG-2023)  Abnormal ECG  When compared with ECG of 16-AUG-2023 09:01,  Premature ventricular complexes are now Present  Confirmed by Woody Ryan ([id number]) on 9/20/2024 4:05:32 PM        Assessment/Plan   64-year-old with CKD 4, DM2, recent development of ADHF [place], still with signs of volume overload echo today showing worsening aortic stenosis now in the moderate-severe range with mild to moderate AI suspected bicuspid aortic valve disease mild aortopathy, has had previous LAD and circumflex stents.  Continuing him on Lasix 20, hesitant to increase Jardiance further due to his CKD.  He is already on 10.  He is already on dulaglutide.  Will continue him on his current medical regimen we will place referral to structural heart program for consideration of of TAVR technical issues related to bicuspid aortic valve he may need an open surgery.  Given his advanced CKD I think percutaneous intervention would be the better option if at all possible or technically feasible.  I told him to call me in a week or 2 if he has not heard from them.  Thank you for your referral        Orders:  No orders of the defined types were placed in this encounter.     Followup Appts:  Future Appointments   Date Time Provider Department Center   3/14/2025  9:40 AM Woody Ryan MD [id number] [facility]   5/16/2025 12:30 PM Supa Zuñiga MD [id number] [facility]   7/9/2025  9:20 AM Antony Rodríguez MD [id number] [facility]           ____________________________________________________________  Woody Ryan MD    Senior Attending Physician  [facility]  [facility]

## 2025-01-20 ENCOUNTER — TELEPHONE (OUTPATIENT)
Dept: CARDIOLOGY | Facility: HOSPITAL | Age: 65
End: 2025-01-20
Payer: COMMERCIAL

## 2025-01-20 DIAGNOSIS — I35.0 NONRHEUMATIC AORTIC VALVE STENOSIS: Primary | ICD-10-CM

## 2025-01-21 ENCOUNTER — LAB (OUTPATIENT)
Dept: LAB | Facility: LAB | Age: 65
End: 2025-01-21
Payer: COMMERCIAL

## 2025-01-21 DIAGNOSIS — I35.0 NONRHEUMATIC AORTIC VALVE STENOSIS: ICD-10-CM

## 2025-01-21 PROBLEM — Z86.39 HISTORY OF ELEVATED LIPIDS: Status: RESOLVED | Noted: 2025-01-21 | Resolved: 2025-01-21

## 2025-01-21 PROBLEM — H40.50X0: Status: RESOLVED | Noted: 2025-01-21 | Resolved: 2025-01-21

## 2025-01-21 LAB
ALBUMIN SERPL BCP-MCNC: 3.9 G/DL (ref 3.4–5)
ANION GAP SERPL CALC-SCNC: 17 MMOL/L (ref 10–20)
BUN SERPL-MCNC: 38 MG/DL (ref 6–23)
CALCIUM SERPL-MCNC: 9.2 MG/DL (ref 8.6–10.6)
CHLORIDE SERPL-SCNC: 101 MMOL/L (ref 98–107)
CO2 SERPL-SCNC: 28 MMOL/L (ref 21–32)
CREAT SERPL-MCNC: 1.88 MG/DL (ref 0.5–1.3)
EGFRCR SERPLBLD CKD-EPI 2021: 40 ML/MIN/1.73M*2
GLUCOSE SERPL-MCNC: 136 MG/DL (ref 74–99)
PHOSPHATE SERPL-MCNC: 4.8 MG/DL (ref 2.5–4.9)
POTASSIUM SERPL-SCNC: 4.3 MMOL/L (ref 3.5–5.3)
SODIUM SERPL-SCNC: 142 MMOL/L (ref 136–145)

## 2025-01-21 PROCEDURE — 80069 RENAL FUNCTION PANEL: CPT

## 2025-01-21 NOTE — PROGRESS NOTES
Referral from Dr. Ryan. Nicolas comes to discuss treatment options for bicuspid aortic valve stenosis. Echo showing louisa 1/18 pk gradient 51.7/32.9 DI 0.27 asc aorta 3.7 lvef 44%. History of cad past mi stent-lad. Experiencing Zhang. Additional history of hld, htn, dm, right carotid aso, ckd stage 4 bun 36/creat 2.65. Ines Sanders RN     HPI     64 y.o. y/o male presents for evaluation of mod/ severe, symptomatic aortic stenosis.  Patient relates a 4 month history of worsening fatigue, SOB, ZHANG.  He was hospitalized in November/2024 for NSTEMI, and did a mid LAD PCI. Since than, he kept symptomatic with SOB when shoveling the snow, walking from the parking lot to the clinic, and going upstairs. Denies overt orthopnea, PND, exertional CP. He denies dizziness.  Denies syncope or presyncope.  Denies increased abdominal girth, edema.  Gradually doing less and less activity secondary to symptoms.    Full 14 point ROS complete and negative except as noted above.      Echo: 44 EF , mod-severe aortic stenosis, AV mean gradient 33, AV Vmax 3.59, LOUISA 1.18   EKG: NSR, 148 NH, 108 QRS, occasional premature ventricular complexes     TAVR Workup:   - NYHA: 2  - LH: Needs to be imported to [facility]  - CT TAVR:  Pending  - Dental clearance:  Regular dentist visits q6 months, no issues.      EFT 1/5  STS   Procedure Type: Isolated AVR  Perioperative OutcomeEstimate %  Operative Mortality0.834%  Morbidity & Mortality3.99%  Stroke0.46%  Renal Failure0.589%  Reoperation2.41%  Prolonged Ventilation1.93%  Deep Sternal Wound Infection0.05%  Long Hospital Stay (>14 days)1.82%  Short Hospital Stay (<6 days)*64.2%        Social History           Tobacco Use    Smoking status: Never    Smokeless tobacco: Never   Substance Use Topics    Alcohol use: Not Currently         Family History          Family History   Problem Relation Name Age of Onset    Skin cancer Mother        Sleep apnea Father        Restless legs syndrome Father        Depression  Brother                RX Allergies        Allergies   Allergen Reactions    Penicillins Hives, Rash and Shortness of breath                 Current Outpatient Medications   Medication Instructions    amLODIPine (NORVASC) 10 mg, oral, Daily    aspirin 81 mg, Daily    atorvastatin (LIPITOR) 80 mg, oral, Daily    B complex-vitamin C-folic acid (Josy-Jesus) 0.8 mg tablet 0.8 mg, Daily    carvedilol (COREG) 3.125 mg, 2 times daily    carvedilol (COREG) 3.125 mg, oral, 2 times daily (morning and late afternoon)    cholecalciferol (VITAMIN D-3) 25 mcg, Daily    clopidogrel (PLAVIX) 75 mg, Daily    empagliflozin (JARDIANCE) 10 mg, oral, Daily    ergocalciferol (VITAMIN D-2) 50,000 Units, oral, Once Weekly    fenofibrate (Triglide) 160 mg tablet 1 tablet, Daily    FreeStyle Josh 2 Sensor kit Use as instructed    FreeStyle Josh 2 Sensor kit use as directed    FreeStyle Josh 3 [place] misc Use as instructed    FreeStyle Josh 3 Sensor device Change every 14 days    FreeStyle Lite Strips strip Daily    gabapentin (NEURONTIN) 300 mg, 3 times daily    hydrALAZINE (APRESOLINE) 75 mg, 3 times daily    icosapent ethyL (Vascepa) 1 gram capsule 2 times daily (morning and late afternoon)    insulin glargine-yfgn (SEMGLEE(INSULIN GLARG-YFGN)PEN) 70 Units, subcutaneous, Nightly    insulin lispro (HUMALOG KWIKPEN INSULIN) 50 Units, subcutaneous, 3 times daily (morning, midday, late afternoon)    isosorbide mononitrate ER (IMDUR) 30 mg, Daily    isosorbide mononitrate ER (IMDUR) 30 mg, oral, Daily, Do not crush or chew.    levothyroxine (SYNTHROID, LEVOXYL) 25 mcg, Daily    losartan (COZAAR) 50 mg, oral, Daily    metoprolol tartrate (LOPRESSOR) 50 mg, oral, 2 times daily    rosuvastatin (CRESTOR) 40 mg, Nightly    SITagliptin phosphate (JANUVIA) 50 mg, Daily    torsemide (DEMADEX) 20 mg, Daily    Trulicity 4.5 mg, subcutaneous, Once Weekly             Vitals:     01/22/25 1058   BP: 155/80   Pulse: 95   SpO2: 96%         Physical Exam:      General:  Alert, calm, well-developed   HEENT:  Pupils equal, round, reactive to light and accommodation. Extraocular movements   Neck:  Supple, without lymphadenopathy or thyromegaly. No carotid bruits.  Lymph:  No axillary, cervical, supraclavicular, pre-auricular, submental, or occipital lymphadenopathy,  Cardiovascular:  Regular rate and rhythm, with normal S1 and S2. Aortic murmurs 3/6, no rubs or gallops. No JVD. 2+ pulses bilaterally - dorsalis pedis and radial.  Lungs:  lung clear. No wheezes. No accessory muscle use or cyanosis. No tenderness to palpation.  Abdomen:  Normoactive bowel sounds. Soft, flat, non-tender, and non-distended. No hepatosplenomegaly; liver span approximately 10 cm.  Skin:  Warm, dry, well-perfused. No rashes or other lesions.  Extremities:  2+ pulses in upper and lower extremities. lower extremity edema ++/4; legs are symmetric in appearance.  Neuro:  Alert and oriented to person, place, and time. Able to communicate well. 5/5 strength in all extremities bilaterally. Sensation intact in all extremities. Normal gait.               Lab Results   Component Value     CR 1.88      HGB 13.0      ALBUMIN 3.9     BNP No results found for requested labs within last 365 days.                         KCCQ Questionnaire        1  Heart failure affects different people in different ways. Some feel shortness of breath while others feel fatigue. Please indicate how much you are limited by heart failure (shortness of breath or fatigue) in your ability to do the following activities over the past 2 weeks. 1 month Structural Heart NP follow-up      A.) Showering/bathing  5. Not at All  B.) Walking 1 block on level ground 4. Slightly  C.) Hurrying or Jogging   3. Moderately     2.  Over the past 2 weeks, how many times did you have swelling in your feet, ankles or legs when you woke up in the morning? 3. 1-2 times a week     3.  Over the past 2 weeks, on average, how many times has fatigue  limited your ability to do what you wanted? 7. Never     4.  Over the past 2 weeks, on average, how many times has shortness of breath limited your ability to do what you wanted? 5. 1-2 times a week     5.  Over the past 2 weeks, on average, how many times have you been forced to sleep sitting up in a chair or with at least 3 pillows to prop you up because of shortness of breath? Less than once a week     6. Over the past 2 weeks, how much has your heart failure limited your enjoyment of life? It has limited my enjoyment of life quite a bit     7. If you had to spend the rest of your life with your heart failure the way it is right now, how would you feel about this? 1. Not at all      8. How much does your heart failure affect your lifestyle? Please indicate how your heart failure may have limited yourparticipation in the following activities over the past 2 weeks     A.)  Hobbies, recreational activities  2. Limited quite a bit     B.) Working or doing household chores  2. Limited quite a bit     C.) Visiting family or friends out of your home  2. Limited quite a bit     5m Walk:    11.9 sec     Impression:   64 y.o. y/o male presents for evaluation of mod/ severe, symptomatic aortic stenosis.  Patient relates a 4 month history of worsening fatigue, SOB, JENNINGS.  He was hospitalized in November/2024 for NSTEMI, and did a mid LAD PCI. We will discuss TAVR x SAVR for this patient.     Plan:   We need to import his LHC and PCI for [facility] ( he did it in [place])     We will get CT TAVR protocol      The overall decision regarding the best treatment for this condition is complex.  We discussed options of both surgical aortic valve replacement and transcatheter aortic valve replacement along with risks and benefits involved with both of them in detail.     We discussed all the risks associated with the procedure, including but not limited to stroke, MI, pericardial tamponade, vascular complications, infection and death  were discussed with the patient. The risk of needing a permament pacemaker was also discussed in detail. The patient verbalized understanding and decided to proceed with the procedure.      We will discuss this patient's case at our Valve Team meeting with representatives from Structural Heart and Cardiac Surgery. Our nurse navigators will contact patient with further diagnostic needs and formal plan.

## 2025-01-22 ENCOUNTER — OFFICE VISIT (OUTPATIENT)
Dept: CARDIOLOGY | Facility: HOSPITAL | Age: 65
End: 2025-01-22
Payer: COMMERCIAL

## 2025-01-22 ENCOUNTER — TELEMEDICINE (OUTPATIENT)
Dept: CARDIAC SURGERY | Facility: HOSPITAL | Age: 65
End: 2025-01-22
Payer: COMMERCIAL

## 2025-01-22 VITALS
HEART RATE: 95 BPM | DIASTOLIC BLOOD PRESSURE: 80 MMHG | BODY MASS INDEX: 41.63 KG/M2 | WEIGHT: 307.4 LBS | OXYGEN SATURATION: 96 % | HEIGHT: 72 IN | SYSTOLIC BLOOD PRESSURE: 155 MMHG

## 2025-01-22 DIAGNOSIS — I25.10 ATHEROSCLEROSIS OF NATIVE CORONARY ARTERY WITHOUT ANGINA PECTORIS, UNSPECIFIED WHETHER NATIVE OR TRANSPLANTED HEART: ICD-10-CM

## 2025-01-22 DIAGNOSIS — Q23.0 AORTIC STENOSIS DUE TO BICUSPID AORTIC VALVE: ICD-10-CM

## 2025-01-22 DIAGNOSIS — I35.0 NONRHEUMATIC AORTIC VALVE STENOSIS: Primary | ICD-10-CM

## 2025-01-22 DIAGNOSIS — Q23.81 AORTIC STENOSIS DUE TO BICUSPID AORTIC VALVE: ICD-10-CM

## 2025-01-22 PROCEDURE — 99205 OFFICE O/P NEW HI 60 MIN: CPT | Performed by: THORACIC SURGERY (CARDIOTHORACIC VASCULAR SURGERY)

## 2025-01-22 PROCEDURE — 3079F DIAST BP 80-89 MM HG: CPT | Performed by: INTERNAL MEDICINE

## 2025-01-22 PROCEDURE — 3008F BODY MASS INDEX DOCD: CPT | Performed by: INTERNAL MEDICINE

## 2025-01-22 PROCEDURE — 3044F HG A1C LEVEL LT 7.0%: CPT | Performed by: INTERNAL MEDICINE

## 2025-01-22 PROCEDURE — 3077F SYST BP >= 140 MM HG: CPT | Performed by: INTERNAL MEDICINE

## 2025-01-22 PROCEDURE — 3044F HG A1C LEVEL LT 7.0%: CPT | Performed by: THORACIC SURGERY (CARDIOTHORACIC VASCULAR SURGERY)

## 2025-01-22 PROCEDURE — 99214 OFFICE O/P EST MOD 30 MIN: CPT | Performed by: INTERNAL MEDICINE

## 2025-01-22 ASSESSMENT — ENCOUNTER SYMPTOMS
DEPRESSION: 0
LOSS OF SENSATION IN FEET: 0
OCCASIONAL FEELINGS OF UNSTEADINESS: 0

## 2025-01-22 ASSESSMENT — PAIN SCALES - GENERAL: PAINLEVEL_OUTOF10: 0-NO PAIN

## 2025-01-23 ENCOUNTER — TELEPHONE (OUTPATIENT)
Dept: CARDIOLOGY | Facility: HOSPITAL | Age: 65
End: 2025-01-23
Payer: COMMERCIAL

## 2025-01-23 NOTE — PROGRESS NOTES
PCP: Dr. Beckford  Cards: Dr. Ryan    PMH:   Specialty Problems          Cardiology Problems    Familial combined hyperlipidemia        Hypertension        Aortic atherosclerosis (CMS-HCC)        Aortic stenosis, mild        Atherosclerosis of coronary artery        Atypical chest pain        Bicuspid aortic valve        Carotid atherosclerosis        Hyperlipidemia        Mild aortic insufficiency        Retinal neovascularization        Right-sided carotid artery disease (CMS-HCC)        Stable angina (CMS-HCC)            HPI    64 y.o. y/o male presents for evaluation of mod/ severe, symptomatic aortic stenosis.  Patient relates a 4 month history of worsening fatigue, SOB, JENNINGS.  He was hospitalized in November/2024 for NSTEMI, and did a mid LAD PCI. Since than, he kept symptomatic with SOB when shoveling the snow, walking from the parking lot to the clinic, and going upstairs. Denies overt orthopnea, PND, exertional CP. He denies dizziness.  Denies syncope or presyncope.  Denies increased abdominal girth, edema.  Gradually doing less and less activity secondary to symptoms.    Full 14 point ROS complete and negative except as noted above.     Echo: 44 EF , mod-severe aortic stenosis, AV mean gradient 33, AV Vmax 3.59, MARIE 1.18   EKG: NSR, 148 CA, 108 QRS, occasional premature ventricular complexes    TAVR Workup:   - NYHA: 2  - LHC: Needs to be imported to [facility]  - CT TAVR:  Pending  - Dental clearance:  Regular dentist visits q6 months, no issues.     EFT 1/5  STS   Procedure Type: Isolated AVR  Perioperative Outcome Estimate %  Operative Mortality 0.834%  Morbidity & Mortality 3.99%  Stroke 0.46%  Renal Failure 0.589%  Reoperation 2.41%  Prolonged Ventilation 1.93%  Deep Sternal Wound Infection 0.05%  Long Hospital Stay (>14 days) 1.82%  Short Hospital Stay (<6 days)* 64.2%      Social History     Tobacco Use    Smoking status: Never    Smokeless tobacco: Never   Substance Use Topics    Alcohol use: Not  Currently        Family History   Problem Relation Name Age of Onset    Skin cancer Mother      Sleep apnea Father      Restless legs syndrome Father      Depression Brother          Allergies   Allergen Reactions    Penicillins Hives, Rash and Shortness of breath        Current Outpatient Medications   Medication Instructions    amLODIPine (NORVASC) 10 mg, oral, Daily    aspirin 81 mg, Daily    atorvastatin (LIPITOR) 80 mg, oral, Daily    B complex-vitamin C-folic acid (Josy-Jesus) 0.8 mg tablet 0.8 mg, Daily    carvedilol (COREG) 3.125 mg, 2 times daily    carvedilol (COREG) 3.125 mg, oral, 2 times daily (morning and late afternoon)    cholecalciferol (VITAMIN D-3) 25 mcg, Daily    clopidogrel (PLAVIX) 75 mg, Daily    empagliflozin (JARDIANCE) 10 mg, oral, Daily    ergocalciferol (VITAMIN D-2) 50,000 Units, oral, Once Weekly    fenofibrate (Triglide) 160 mg tablet 1 tablet, Daily    FreeStyle Josh 2 Sensor kit Use as instructed    FreeStyle Josh 2 Sensor kit use as directed    FreeStyle Josh 3 [place] misc Use as instructed    FreeStyle Josh 3 Sensor device Change every 14 days    FreeStyle Lite Strips strip Daily    gabapentin (NEURONTIN) 300 mg, 3 times daily    hydrALAZINE (APRESOLINE) 75 mg, 3 times daily    icosapent ethyL (Vascepa) 1 gram capsule 2 times daily (morning and late afternoon)    insulin glargine-yfgn (SEMGLEE(INSULIN GLARG-YFGN)PEN) 70 Units, subcutaneous, Nightly    insulin lispro (HUMALOG KWIKPEN INSULIN) 50 Units, subcutaneous, 3 times daily (morning, midday, late afternoon)    isosorbide mononitrate ER (IMDUR) 30 mg, Daily    isosorbide mononitrate ER (IMDUR) 30 mg, oral, Daily, Do not crush or chew.    levothyroxine (SYNTHROID, LEVOXYL) 25 mcg, Daily    losartan (COZAAR) 50 mg, oral, Daily    metoprolol tartrate (LOPRESSOR) 50 mg, oral, 2 times daily    rosuvastatin (CRESTOR) 40 mg, Nightly    SITagliptin phosphate (JANUVIA) 50 mg, Daily    torsemide (DEMADEX) 20 mg, Daily    Trulicity  4.5 mg, subcutaneous, Once Weekly        Vitals:    01/22/25 1058   BP: 155/80   Pulse: 95   SpO2: 96%        Physical Exam:    General:  Alert, calm, well-developed   HEENT:  Pupils equal, round, reactive to light and accommodation. Extraocular movements   Neck:  Supple, without lymphadenopathy or thyromegaly. No carotid bruits.  Lymph:  No axillary, cervical, supraclavicular, pre-auricular, submental, or occipital lymphadenopathy,  Cardiovascular:  Regular rate and rhythm, with normal S1 and S2. Aortic murmurs 3/6, no rubs or gallops. No JVD. 2+ pulses bilaterally - dorsalis pedis and radial.  Lungs:  lung clear. No wheezes. No accessory muscle use or cyanosis. No tenderness to palpation.  Abdomen:  Normoactive bowel sounds. Soft, flat, non-tender, and non-distended. No hepatosplenomegaly; liver span approximately 10 cm.  Skin:  Warm, dry, well-perfused. No rashes or other lesions.  Extremities:  2+ pulses in upper and lower extremities. lower extremity edema ++/4; legs are symmetric in appearance.  Neuro:  Alert and oriented to person, place, and time. Able to communicate well. 5/5 strength in all extremities bilaterally. Sensation intact in all extremities. Normal gait.        Lab Results   Component Value    CR 1.88     HGB 13.0     ALBUMIN 3.9    BNP No results found for requested labs within last 365 days.            KCCQ Questionnaire      1  Heart failure affects different people in different ways. Some feel shortness of breath while others feel fatigue. Please indicate how much you are limited by heart failure (shortness of breath or fatigue) in your ability to do the following activities over the past 2 weeks. 1 month [facility] NP follow-up     A.) Showering/bathing  5. Not at All  B.) Walking 1 block on level ground 4. Slightly  C.) Hurrying or Jogging   3. Moderately    2.  Over the past 2 weeks, how many times did you have swelling in your feet, ankles or legs when you woke up in the  morning? 3. 1-2 times a week    3.  Over the past 2 weeks, on average, how many times has fatigue limited your ability to do what you wanted? 7. Never    4.  Over the past 2 weeks, on average, how many times has shortness of breath limited your ability to do what you wanted? 5. 1-2 times a week    5.  Over the past 2 weeks, on average, how many times have you been forced to sleep sitting up in a chair or with at least 3 pillows to prop you up because of shortness of breath? Less than once a week    6. Over the past 2 weeks, how much has your heart failure limited your enjoyment of life? It has limited my enjoyment of life quite a bit    7. If you had to spend the rest of your life with your heart failure the way it is right now, how would you feel about this? 1. Not at all     8. How much does your heart failure affect your lifestyle? Please indicate how your heart failure may have limited yourparticipation in the following activities over the past 2 weeks    A.)  Hobbies, recreational activities  2. Limited quite a bit    B.) Working or doing household chores  2. Limited quite a bit    C.) Visiting family or friends out of your home  2. Limited quite a bit    5m Walk:    11.9 sec    Impression:   64 y.o. y/o male presents for evaluation of mod/ severe, symptomatic aortic stenosis.  Patient relates a 4 month history of worsening fatigue, SOB, JENNINGS.  He was hospitalized in November/2024 for NSTEMI, and did a mid LAD PCI. We will discuss TAVR x SAVR for this patient.    Plan:   We need to import his LHC and PCI for [facility] ( he did it in [place])    We will get CT TAVR protocol     The overall decision regarding the best treatment for this condition is complex.  We discussed options of both surgical aortic valve replacement and transcatheter aortic valve replacement along with risks and benefits involved with both of them in detail.    We discussed all the risks associated with the procedure, including but not limited  to stroke, MI, pericardial tamponade, vascular complications, infection and death were discussed with the patient. The risk of needing a permament pacemaker was also discussed in detail. The patient verbalized understanding and decided to proceed with the procedure.     We will discuss this patient's case at our Valve Team meeting with representatives from [facility]. Our nurse navigators will contact patient with further diagnostic needs and formal plan.

## 2025-01-23 NOTE — PROGRESS NOTES
PCP: Dr. Beckford  Cards: Dr. Ryan    PMH:   Specialty Problems          Cardiology Problems    Familial combined hyperlipidemia        Hypertension        Aortic atherosclerosis (CMS-HCC)        Aortic stenosis, mild        Atherosclerosis of coronary artery        Atypical chest pain        Bicuspid aortic valve        Carotid atherosclerosis        Hyperlipidemia        Mild aortic insufficiency        Retinal neovascularization        Right-sided carotid artery disease (CMS-HCC)        Stable angina (CMS-HCC)            HPI    64 y.o. y/o male presents for evaluation of mod/ severe, symptomatic aortic stenosis.  Patient relates a 4 month history of worsening fatigue, SOB, JENNINGS.  He was hospitalized in November/2024 for NSTEMI, and did a mid LAD PCI. Since than, he kept symptomatic with SOB when shoveling the snow, walking from the parking lot to the clinic, and going upstairs. Denies overt orthopnea, PND, exertional CP. He denies dizziness.  Denies syncope or presyncope.  Denies increased abdominal girth, edema.  Gradually doing less and less activity secondary to symptoms.    Full 14 point ROS complete and negative except as noted above.     Echo: 44 EF , mod-severe aortic stenosis, AV mean gradient 33, AV Vmax 3.59, MARIE 1.18   EKG: NSR, 148 MA, 108 QRS, occasional premature ventricular complexes    TAVR Workup:   - NYHA: 2  - LHC: Needs to be imported to [facility]  - CT TAVR:  Pending  - Dental clearance:  Regular dentist visits q6 months, no issues.     EFT 1/5  STS   Procedure Type: Isolated AVR  Perioperative Outcome Estimate %  Operative Mortality 0.834%  Morbidity & Mortality 3.99%  Stroke 0.46%  Renal Failure 0.589%  Reoperation 2.41%  Prolonged Ventilation 1.93%  Deep Sternal Wound Infection 0.05%  Long Hospital Stay (>14 days) 1.82%  Short Hospital Stay (<6 days)* 64.2%      Social History     Tobacco Use    Smoking status: Never    Smokeless tobacco: Never   Substance Use Topics    Alcohol use: Not  Currently        Family History   Problem Relation Name Age of Onset    Skin cancer Mother      Sleep apnea Father      Restless legs syndrome Father      Depression Brother          Allergies   Allergen Reactions    Penicillins Hives, Rash and Shortness of breath        Current Outpatient Medications   Medication Instructions    amLODIPine (NORVASC) 10 mg, oral, Daily    aspirin 81 mg, Daily    atorvastatin (LIPITOR) 80 mg, oral, Daily    B complex-vitamin C-folic acid (Josy-Jesus) 0.8 mg tablet 0.8 mg, Daily    carvedilol (COREG) 3.125 mg, 2 times daily    carvedilol (COREG) 3.125 mg, oral, 2 times daily (morning and late afternoon)    cholecalciferol (VITAMIN D-3) 25 mcg, Daily    clopidogrel (PLAVIX) 75 mg, Daily    empagliflozin (JARDIANCE) 10 mg, oral, Daily    ergocalciferol (VITAMIN D-2) 50,000 Units, oral, Once Weekly    fenofibrate (Triglide) 160 mg tablet 1 tablet, Daily    FreeStyle Josh 2 Sensor kit Use as instructed    FreeStyle Josh 2 Sensor kit use as directed    FreeStyle Josh 3 [place] misc Use as instructed    FreeStyle Josh 3 Sensor device Change every 14 days    FreeStyle Lite Strips strip Daily    gabapentin (NEURONTIN) 300 mg, 3 times daily    hydrALAZINE (APRESOLINE) 75 mg, 3 times daily    icosapent ethyL (Vascepa) 1 gram capsule 2 times daily (morning and late afternoon)    insulin glargine-yfgn (SEMGLEE(INSULIN GLARG-YFGN)PEN) 70 Units, subcutaneous, Nightly    insulin lispro (HUMALOG KWIKPEN INSULIN) 50 Units, subcutaneous, 3 times daily (morning, midday, late afternoon)    isosorbide mononitrate ER (IMDUR) 30 mg, Daily    isosorbide mononitrate ER (IMDUR) 30 mg, oral, Daily, Do not crush or chew.    levothyroxine (SYNTHROID, LEVOXYL) 25 mcg, Daily    losartan (COZAAR) 50 mg, oral, Daily    metoprolol tartrate (LOPRESSOR) 50 mg, oral, 2 times daily    rosuvastatin (CRESTOR) 40 mg, Nightly    SITagliptin phosphate (JANUVIA) 50 mg, Daily    torsemide (DEMADEX) 20 mg, Daily    Trulicity  4.5 mg, subcutaneous, Once Weekly        Vitals:    01/22/25 1058   BP: 155/80   Pulse: 95   SpO2: 96%        Physical Exam:    General:  Alert, calm, well-developed   HEENT:  Pupils equal, round, reactive to light and accommodation. Extraocular movements   Neck:  Supple, without lymphadenopathy or thyromegaly. No carotid bruits.  Lymph:  No axillary, cervical, supraclavicular, pre-auricular, submental, or occipital lymphadenopathy,  Cardiovascular:  Regular rate and rhythm, with normal S1 and S2. Aortic murmurs 3/6, no rubs or gallops. No JVD. 2+ pulses bilaterally - dorsalis pedis and radial.  Lungs:  lung clear. No wheezes. No accessory muscle use or cyanosis. No tenderness to palpation.  Abdomen:  Normoactive bowel sounds. Soft, flat, non-tender, and non-distended. No hepatosplenomegaly; liver span approximately 10 cm.  Skin:  Warm, dry, well-perfused. No rashes or other lesions.  Extremities:  2+ pulses in upper and lower extremities. lower extremity edema ++/4; legs are symmetric in appearance.  Neuro:  Alert and oriented to person, place, and time. Able to communicate well. 5/5 strength in all extremities bilaterally. Sensation intact in all extremities. Normal gait.        Lab Results   Component Value    CR 1.88     HGB 13.0     ALBUMIN 3.9    BNP No results found for requested labs within last 365 days.            KCCQ Questionnaire      1  Heart failure affects different people in different ways. Some feel shortness of breath while others feel fatigue. Please indicate how much you are limited by heart failure (shortness of breath or fatigue) in your ability to do the following activities over the past 2 weeks. 1 month [facility] NP follow-up     A.) Showering/bathing  5. Not at All  B.) Walking 1 block on level ground 4. Slightly  C.) Hurrying or Jogging   3. Moderately    2.  Over the past 2 weeks, how many times did you have swelling in your feet, ankles or legs when you woke up in the  morning? 3. 1-2 times a week    3.  Over the past 2 weeks, on average, how many times has fatigue limited your ability to do what you wanted? 7. Never    4.  Over the past 2 weeks, on average, how many times has shortness of breath limited your ability to do what you wanted? 5. 1-2 times a week    5.  Over the past 2 weeks, on average, how many times have you been forced to sleep sitting up in a chair or with at least 3 pillows to prop you up because of shortness of breath? Less than once a week    6. Over the past 2 weeks, how much has your heart failure limited your enjoyment of life? It has limited my enjoyment of life quite a bit    7. If you had to spend the rest of your life with your heart failure the way it is right now, how would you feel about this? 1. Not at all     8. How much does your heart failure affect your lifestyle? Please indicate how your heart failure may have limited yourparticipation in the following activities over the past 2 weeks    A.)  Hobbies, recreational activities  2. Limited quite a bit    B.) Working or doing household chores  2. Limited quite a bit    C.) Visiting family or friends out of your home  2. Limited quite a bit    5m Walk:    11.9 sec    Impression:   64 y.o. y/o male presents for evaluation of mod/ severe, symptomatic aortic stenosis.  Patient relates a 4 month history of worsening fatigue, SOB, JENNINGS.  He was hospitalized in November/2024 for NSTEMI, and did a mid LAD PCI. We will discuss TAVR x SAVR for this patient.    Plan:   We need to import his LHC and PCI for [facility] ( he did it in [place])    We will get CT TAVR protocol     The overall decision regarding the best treatment for this condition is complex.  We discussed options of both surgical aortic valve replacement and transcatheter aortic valve replacement along with risks and benefits involved with both of them in detail.    We discussed all the risks associated with the procedure, including but not limited  to stroke, MI, pericardial tamponade, vascular complications, infection and death were discussed with the patient. The risk of needing a permament pacemaker was also discussed in detail. The patient verbalized understanding and decided to proceed with the procedure.     We will discuss this patient's case at our Valve Team meeting with representatives from [facility]. Our nurse navigators will contact patient with further diagnostic needs and formal plan.

## 2025-01-27 NOTE — PROGRESS NOTES
KCCQ Questionnaire      1  Heart failure affects different people in different ways. Some feel shortness of breath while others feel fatigue. Please indicate how much you are limited by heart failure (shortness of breath or fatigue) in your ability to do the following activities over the past 2 weeks. PRE PROCEDURE    A.) Showering/bathing  5. Not at All  B.) Walking 1 block on level ground 4. Slightly  C.) Hurrying or Jogging   3. Moderately    2.  Over the past 2 weeks, how many times did you have swelling in your feet, ankles or legs when you woke up in the morning? 3. 1-2 times a week    3.  Over the past 2 weeks, on average, how many times has fatigue limited your ability to do what you wanted? 7. Never    4.  Over the past 2 weeks, on average, how many times has shortness of breath limited your ability to do what you wanted? 5. 1-2 times a week    5.  Over the past 2 weeks, on average, how many times have you been forced to sleep sitting up in a chair or with at least 3 pillows to prop you up because of shortness of breath? Less than once a week    6. Over the past 2 weeks, how much has your heart failure limited your enjoyment of life? It has limited my enjoyment of life quite a bit    7. If you had to spend the rest of your life with your heart failure the way it is right now, how would you feel about this? 1. Not at all     8. How much does your heart failure affect your lifestyle? Please indicate how your heart failure may have limited yourparticipation in the following activities over the past 2 weeks    A.)  Hobbies, recreational activities  2. Limited quite a bit    B.) Working or doing household chores  2. Limited quite a bit    C.) Visiting family or friends out of your home  2. Limited quite a bit    5 Meter Walk___11.9_______seconds

## 2025-02-03 ENCOUNTER — TELEPHONE (OUTPATIENT)
Dept: PRIMARY CARE | Facility: CLINIC | Age: 65
End: 2025-02-03
Payer: COMMERCIAL

## 2025-02-10 ENCOUNTER — HOSPITAL ENCOUNTER (OUTPATIENT)
Dept: RADIOLOGY | Facility: HOSPITAL | Age: 65
End: 2025-02-10
Payer: COMMERCIAL

## 2025-03-14 ENCOUNTER — APPOINTMENT (OUTPATIENT)
Dept: CARDIOLOGY | Facility: CLINIC | Age: 65
End: 2025-03-14
Payer: COMMERCIAL

## 2025-05-16 ENCOUNTER — APPOINTMENT (OUTPATIENT)
Dept: ENDOCRINOLOGY | Facility: CLINIC | Age: 65
End: 2025-05-16
Payer: COMMERCIAL

## 2025-07-09 ENCOUNTER — APPOINTMENT (OUTPATIENT)
Dept: NEPHROLOGY | Facility: CLINIC | Age: 65
End: 2025-07-09
Payer: COMMERCIAL